# Patient Record
Sex: FEMALE | Race: WHITE | Employment: UNEMPLOYED | URBAN - METROPOLITAN AREA
[De-identification: names, ages, dates, MRNs, and addresses within clinical notes are randomized per-mention and may not be internally consistent; named-entity substitution may affect disease eponyms.]

---

## 2017-02-13 ENCOUNTER — OFFICE VISIT (OUTPATIENT)
Dept: FAMILY MEDICINE CLINIC | Age: 77
End: 2017-02-13

## 2017-02-13 VITALS
SYSTOLIC BLOOD PRESSURE: 124 MMHG | HEART RATE: 82 BPM | BODY MASS INDEX: 27.74 KG/M2 | HEIGHT: 68 IN | WEIGHT: 183 LBS | OXYGEN SATURATION: 98 % | TEMPERATURE: 97.5 F | RESPIRATION RATE: 18 BRPM | DIASTOLIC BLOOD PRESSURE: 82 MMHG

## 2017-02-13 DIAGNOSIS — H35.30 MACULAR DEGENERATION: ICD-10-CM

## 2017-02-13 DIAGNOSIS — E11.39 TYPE 2 DIABETES MELLITUS WITH OTHER OPHTHALMIC COMPLICATION: ICD-10-CM

## 2017-02-13 DIAGNOSIS — I10 ESSENTIAL HYPERTENSION: Primary | ICD-10-CM

## 2017-02-13 DIAGNOSIS — I25.10 CORONARY ARTERY DISEASE INVOLVING NATIVE HEART WITHOUT ANGINA PECTORIS, UNSPECIFIED VESSEL OR LESION TYPE: ICD-10-CM

## 2017-02-13 DIAGNOSIS — E03.9 ACQUIRED HYPOTHYROIDISM: ICD-10-CM

## 2017-02-13 LAB — HBA1C MFR BLD: 6.3 % (ref 4.8–5.9)

## 2017-02-13 PROCEDURE — G8427 DOCREV CUR MEDS BY ELIG CLIN: HCPCS | Performed by: FAMILY MEDICINE

## 2017-02-13 PROCEDURE — 1036F TOBACCO NON-USER: CPT | Performed by: FAMILY MEDICINE

## 2017-02-13 PROCEDURE — G8484 FLU IMMUNIZE NO ADMIN: HCPCS | Performed by: FAMILY MEDICINE

## 2017-02-13 PROCEDURE — 1123F ACP DISCUSS/DSCN MKR DOCD: CPT | Performed by: FAMILY MEDICINE

## 2017-02-13 PROCEDURE — G8598 ASA/ANTIPLAT THER USED: HCPCS | Performed by: FAMILY MEDICINE

## 2017-02-13 PROCEDURE — G8400 PT W/DXA NO RESULTS DOC: HCPCS | Performed by: FAMILY MEDICINE

## 2017-02-13 PROCEDURE — G8420 CALC BMI NORM PARAMETERS: HCPCS | Performed by: FAMILY MEDICINE

## 2017-02-13 PROCEDURE — 99214 OFFICE O/P EST MOD 30 MIN: CPT | Performed by: FAMILY MEDICINE

## 2017-02-13 PROCEDURE — 1090F PRES/ABSN URINE INCON ASSESS: CPT | Performed by: FAMILY MEDICINE

## 2017-02-13 PROCEDURE — 4040F PNEUMOC VAC/ADMIN/RCVD: CPT | Performed by: FAMILY MEDICINE

## 2017-02-13 RX ORDER — ATORVASTATIN CALCIUM 80 MG/1
TABLET, FILM COATED ORAL
Qty: 90 TABLET | Refills: 3 | Status: SHIPPED | OUTPATIENT
Start: 2017-02-13 | End: 2017-08-10 | Stop reason: SDUPTHER

## 2017-02-13 RX ORDER — METOPROLOL SUCCINATE 200 MG/1
TABLET, EXTENDED RELEASE ORAL
Qty: 90 TABLET | Refills: 3 | Status: SHIPPED | OUTPATIENT
Start: 2017-02-13 | End: 2017-08-22 | Stop reason: SDUPTHER

## 2017-02-13 RX ORDER — DIAZEPAM 5 MG/1
5 TABLET ORAL 2 TIMES DAILY PRN
Qty: 60 TABLET | Refills: 5 | Status: SHIPPED | OUTPATIENT
Start: 2017-02-13 | End: 2017-08-22 | Stop reason: SDUPTHER

## 2017-02-13 RX ORDER — LEVOTHYROXINE SODIUM 137 UG/1
TABLET ORAL
Qty: 90 TABLET | Refills: 3 | Status: SHIPPED | OUTPATIENT
Start: 2017-02-13 | End: 2017-08-22 | Stop reason: SDUPTHER

## 2017-02-13 ASSESSMENT — ENCOUNTER SYMPTOMS
RESPIRATORY NEGATIVE: 1
CHEST TIGHTNESS: 0
COUGH: 0
EYES NEGATIVE: 1
RHINORRHEA: 0
GASTROINTESTINAL NEGATIVE: 1

## 2017-03-29 RX ORDER — NITROGLYCERIN 0.4 MG/1
0.4 TABLET SUBLINGUAL EVERY 5 MIN PRN
Qty: 25 TABLET | Refills: 1 | Status: SHIPPED | OUTPATIENT
Start: 2017-03-29 | End: 2017-08-22 | Stop reason: SDUPTHER

## 2017-04-08 ENCOUNTER — HOSPITAL ENCOUNTER (EMERGENCY)
Age: 77
Discharge: HOME | End: 2017-04-08
Attending: EMERGENCY MEDICINE
Payer: MEDICARE

## 2017-04-08 ENCOUNTER — APPOINTMENT (OUTPATIENT)
Dept: GENERAL RADIOLOGY | Age: 77
End: 2017-04-08
Payer: MEDICARE

## 2017-04-08 VITALS
RESPIRATION RATE: 16 BRPM | BODY MASS INDEX: 27.32 KG/M2 | DIASTOLIC BLOOD PRESSURE: 63 MMHG | HEART RATE: 80 BPM | TEMPERATURE: 97.8 F | HEIGHT: 66 IN | SYSTOLIC BLOOD PRESSURE: 124 MMHG | WEIGHT: 170 LBS | OXYGEN SATURATION: 99 %

## 2017-04-08 DIAGNOSIS — R07.9 CHEST PAIN, UNSPECIFIED TYPE: Primary | ICD-10-CM

## 2017-04-08 LAB
ANION GAP SERPL CALCULATED.3IONS-SCNC: 15 MEQ/L (ref 7–13)
BASOPHILS ABSOLUTE: 0.1 K/UL (ref 0–0.2)
BASOPHILS RELATIVE PERCENT: 0.8 %
BUN BLDV-MCNC: 20 MG/DL (ref 8–23)
CALCIUM SERPL-MCNC: 9.5 MG/DL (ref 8.6–10.2)
CHLORIDE BLD-SCNC: 102 MEQ/L (ref 98–107)
CO2: 23 MEQ/L (ref 22–29)
CREAT SERPL-MCNC: 1.02 MG/DL (ref 0.5–0.9)
EOSINOPHILS ABSOLUTE: 0.3 K/UL (ref 0–0.7)
EOSINOPHILS RELATIVE PERCENT: 3.5 %
GFR AFRICAN AMERICAN: >60
GFR NON-AFRICAN AMERICAN: 52.3
GLUCOSE BLD-MCNC: 122 MG/DL (ref 74–109)
HCT VFR BLD CALC: 35.1 % (ref 37–47)
HEMOGLOBIN: 11.6 G/DL (ref 12–16)
LYMPHOCYTES ABSOLUTE: 3.5 K/UL (ref 1–4.8)
LYMPHOCYTES RELATIVE PERCENT: 35.2 %
MCH RBC QN AUTO: 28.1 PG (ref 27–31.3)
MCHC RBC AUTO-ENTMCNC: 33 % (ref 33–37)
MCV RBC AUTO: 85.3 FL (ref 82–100)
MONOCYTES ABSOLUTE: 0.9 K/UL (ref 0.2–0.8)
MONOCYTES RELATIVE PERCENT: 9.3 %
NEUTROPHILS ABSOLUTE: 5.1 K/UL (ref 1.4–6.5)
NEUTROPHILS RELATIVE PERCENT: 51.2 %
PDW BLD-RTO: 17.2 % (ref 11.5–14.5)
PLATELET # BLD: 200 K/UL (ref 130–400)
POTASSIUM SERPL-SCNC: 3.9 MEQ/L (ref 3.5–5.1)
RBC # BLD: 4.11 M/UL (ref 4.2–5.4)
SODIUM BLD-SCNC: 140 MEQ/L (ref 132–144)
TROPONIN: <0.01 NG/ML (ref 0–0.01)
WBC # BLD: 9.9 K/UL (ref 4.8–10.8)

## 2017-04-08 PROCEDURE — 93005 ELECTROCARDIOGRAM TRACING: CPT

## 2017-04-08 PROCEDURE — 85025 COMPLETE CBC W/AUTO DIFF WBC: CPT

## 2017-04-08 PROCEDURE — 36415 COLL VENOUS BLD VENIPUNCTURE: CPT

## 2017-04-08 PROCEDURE — 71010 XR CHEST PORTABLE: CPT

## 2017-04-08 PROCEDURE — 80048 BASIC METABOLIC PNL TOTAL CA: CPT

## 2017-04-08 PROCEDURE — 6370000000 HC RX 637 (ALT 250 FOR IP): Performed by: EMERGENCY MEDICINE

## 2017-04-08 PROCEDURE — 84484 ASSAY OF TROPONIN QUANT: CPT

## 2017-04-08 PROCEDURE — 99285 EMERGENCY DEPT VISIT HI MDM: CPT

## 2017-04-08 RX ORDER — SODIUM CHLORIDE 0.9 % (FLUSH) 0.9 %
3 SYRINGE (ML) INJECTION EVERY 8 HOURS
Status: DISCONTINUED | OUTPATIENT
Start: 2017-04-08 | End: 2017-04-08 | Stop reason: HOSPADM

## 2017-04-08 RX ADMIN — NITROGLYCERIN 1 INCH: 20 OINTMENT TOPICAL at 01:52

## 2017-04-08 ASSESSMENT — PAIN DESCRIPTION - DESCRIPTORS: DESCRIPTORS: PRESSURE

## 2017-04-08 ASSESSMENT — PAIN SCALES - GENERAL
PAINLEVEL_OUTOF10: 0
PAINLEVEL_OUTOF10: 2

## 2017-04-08 ASSESSMENT — PAIN DESCRIPTION - LOCATION: LOCATION: CHEST;BACK

## 2017-04-08 ASSESSMENT — PAIN DESCRIPTION - FREQUENCY: FREQUENCY: INTERMITTENT

## 2017-04-08 ASSESSMENT — PAIN DESCRIPTION - ORIENTATION: ORIENTATION: RIGHT;MID

## 2017-04-08 ASSESSMENT — PAIN DESCRIPTION - PAIN TYPE: TYPE: ACUTE PAIN

## 2017-04-10 ENCOUNTER — CARE COORDINATION (OUTPATIENT)
Dept: CARE COORDINATION | Age: 77
End: 2017-04-10

## 2017-04-10 ENCOUNTER — OFFICE VISIT (OUTPATIENT)
Dept: FAMILY MEDICINE CLINIC | Age: 77
End: 2017-04-10

## 2017-04-10 VITALS
SYSTOLIC BLOOD PRESSURE: 122 MMHG | BODY MASS INDEX: 28.41 KG/M2 | OXYGEN SATURATION: 97 % | HEIGHT: 67 IN | TEMPERATURE: 97.8 F | HEART RATE: 72 BPM | RESPIRATION RATE: 18 BRPM | WEIGHT: 181 LBS | DIASTOLIC BLOOD PRESSURE: 80 MMHG

## 2017-04-10 DIAGNOSIS — I10 ESSENTIAL HYPERTENSION: ICD-10-CM

## 2017-04-10 DIAGNOSIS — R10.13 EPIGASTRIC PAIN: Primary | ICD-10-CM

## 2017-04-10 DIAGNOSIS — K25.7 CHRONIC GASTRIC ULCER: ICD-10-CM

## 2017-04-10 DIAGNOSIS — E03.9 ACQUIRED HYPOTHYROIDISM: ICD-10-CM

## 2017-04-10 PROCEDURE — G8427 DOCREV CUR MEDS BY ELIG CLIN: HCPCS | Performed by: FAMILY MEDICINE

## 2017-04-10 PROCEDURE — G8420 CALC BMI NORM PARAMETERS: HCPCS | Performed by: FAMILY MEDICINE

## 2017-04-10 PROCEDURE — 4040F PNEUMOC VAC/ADMIN/RCVD: CPT | Performed by: FAMILY MEDICINE

## 2017-04-10 PROCEDURE — 1036F TOBACCO NON-USER: CPT | Performed by: FAMILY MEDICINE

## 2017-04-10 PROCEDURE — 99213 OFFICE O/P EST LOW 20 MIN: CPT | Performed by: FAMILY MEDICINE

## 2017-04-10 PROCEDURE — 1123F ACP DISCUSS/DSCN MKR DOCD: CPT | Performed by: FAMILY MEDICINE

## 2017-04-10 PROCEDURE — G8598 ASA/ANTIPLAT THER USED: HCPCS | Performed by: FAMILY MEDICINE

## 2017-04-10 PROCEDURE — 1090F PRES/ABSN URINE INCON ASSESS: CPT | Performed by: FAMILY MEDICINE

## 2017-04-10 PROCEDURE — G8400 PT W/DXA NO RESULTS DOC: HCPCS | Performed by: FAMILY MEDICINE

## 2017-04-10 RX ORDER — PANTOPRAZOLE SODIUM 40 MG/1
40 TABLET, DELAYED RELEASE ORAL DAILY
Qty: 90 TABLET | Refills: 3 | Status: SHIPPED | OUTPATIENT
Start: 2017-04-10 | End: 2017-08-22

## 2017-04-12 LAB
EKG ATRIAL RATE: 80 BPM
EKG P AXIS: -17 DEGREES
EKG P-R INTERVAL: 160 MS
EKG Q-T INTERVAL: 418 MS
EKG QRS DURATION: 140 MS
EKG QTC CALCULATION (BAZETT): 482 MS
EKG R AXIS: -39 DEGREES
EKG T AXIS: 120 DEGREES
EKG VENTRICULAR RATE: 80 BPM

## 2017-08-10 RX ORDER — DIAZEPAM 5 MG/1
5 TABLET ORAL 2 TIMES DAILY PRN
Qty: 60 TABLET | Refills: 5 | Status: CANCELLED | OUTPATIENT
Start: 2017-08-10

## 2017-08-11 DIAGNOSIS — E78.5 HYPERLIPIDEMIA WITH TARGET LDL LESS THAN 70: ICD-10-CM

## 2017-08-11 DIAGNOSIS — E11.9 TYPE 2 DIABETES MELLITUS WITHOUT COMPLICATION, WITHOUT LONG-TERM CURRENT USE OF INSULIN (HCC): ICD-10-CM

## 2017-08-11 DIAGNOSIS — E03.9 ACQUIRED HYPOTHYROIDISM: ICD-10-CM

## 2017-08-11 DIAGNOSIS — I10 ESSENTIAL HYPERTENSION: Primary | ICD-10-CM

## 2017-08-14 DIAGNOSIS — E03.9 ACQUIRED HYPOTHYROIDISM: ICD-10-CM

## 2017-08-14 DIAGNOSIS — E11.9 TYPE 2 DIABETES MELLITUS WITHOUT COMPLICATION, WITHOUT LONG-TERM CURRENT USE OF INSULIN (HCC): ICD-10-CM

## 2017-08-14 DIAGNOSIS — E78.5 HYPERLIPIDEMIA WITH TARGET LDL LESS THAN 70: ICD-10-CM

## 2017-08-14 DIAGNOSIS — I10 ESSENTIAL HYPERTENSION: ICD-10-CM

## 2017-08-14 LAB
ALBUMIN SERPL-MCNC: 4.2 G/DL (ref 3.9–4.9)
ALP BLD-CCNC: 54 U/L (ref 40–130)
ALT SERPL-CCNC: 15 U/L (ref 0–33)
ANION GAP SERPL CALCULATED.3IONS-SCNC: 17 MEQ/L (ref 7–13)
AST SERPL-CCNC: 19 U/L (ref 0–35)
BILIRUB SERPL-MCNC: 0.6 MG/DL (ref 0–1.2)
BUN BLDV-MCNC: 17 MG/DL (ref 8–23)
CALCIUM SERPL-MCNC: 9.2 MG/DL (ref 8.6–10.2)
CHLORIDE BLD-SCNC: 103 MEQ/L (ref 98–107)
CHOLESTEROL, TOTAL: 114 MG/DL (ref 0–199)
CO2: 20 MEQ/L (ref 22–29)
CREAT SERPL-MCNC: 0.77 MG/DL (ref 0.5–0.9)
GFR AFRICAN AMERICAN: >60
GFR NON-AFRICAN AMERICAN: >60
GLOBULIN: 2.8 G/DL (ref 2.3–3.5)
GLUCOSE BLD-MCNC: 150 MG/DL (ref 74–109)
HBA1C MFR BLD: 6.1 % (ref 4.8–5.9)
HDLC SERPL-MCNC: 38 MG/DL (ref 40–59)
LDL CHOLESTEROL CALCULATED: 44 MG/DL (ref 0–129)
POTASSIUM SERPL-SCNC: 5 MEQ/L (ref 3.5–5.1)
SODIUM BLD-SCNC: 140 MEQ/L (ref 132–144)
TOTAL PROTEIN: 7 G/DL (ref 6.4–8.1)
TRIGL SERPL-MCNC: 158 MG/DL (ref 0–200)
TSH SERPL DL<=0.05 MIU/L-ACNC: 0.02 UIU/ML (ref 0.27–4.2)

## 2017-08-14 RX ORDER — ATORVASTATIN CALCIUM 80 MG/1
TABLET, FILM COATED ORAL
Qty: 90 TABLET | Refills: 3 | Status: SHIPPED | OUTPATIENT
Start: 2017-08-14 | End: 2017-08-22 | Stop reason: SDUPTHER

## 2017-08-14 RX ORDER — EMPAGLIFLOZIN 25 MG/1
TABLET, FILM COATED ORAL
Qty: 90 TABLET | Refills: 3 | Status: SHIPPED | OUTPATIENT
Start: 2017-08-14 | End: 2017-08-22

## 2017-08-22 ENCOUNTER — OFFICE VISIT (OUTPATIENT)
Dept: FAMILY MEDICINE CLINIC | Age: 77
End: 2017-08-22

## 2017-08-22 VITALS
DIASTOLIC BLOOD PRESSURE: 88 MMHG | WEIGHT: 170 LBS | OXYGEN SATURATION: 97 % | BODY MASS INDEX: 26.68 KG/M2 | HEART RATE: 74 BPM | RESPIRATION RATE: 16 BRPM | HEIGHT: 67 IN | TEMPERATURE: 97.6 F | SYSTOLIC BLOOD PRESSURE: 138 MMHG

## 2017-08-22 DIAGNOSIS — E03.9 ACQUIRED HYPOTHYROIDISM: ICD-10-CM

## 2017-08-22 DIAGNOSIS — I25.10 CORONARY ARTERY DISEASE INVOLVING NATIVE HEART WITHOUT ANGINA PECTORIS, UNSPECIFIED VESSEL OR LESION TYPE: ICD-10-CM

## 2017-08-22 DIAGNOSIS — Z95.1 S/P CABG X 5: ICD-10-CM

## 2017-08-22 DIAGNOSIS — I10 ESSENTIAL HYPERTENSION: Primary | ICD-10-CM

## 2017-08-22 DIAGNOSIS — Z23 NEED FOR PNEUMOCOCCAL VACCINATION: ICD-10-CM

## 2017-08-22 PROCEDURE — 90670 PCV13 VACCINE IM: CPT | Performed by: FAMILY MEDICINE

## 2017-08-22 PROCEDURE — G0009 ADMIN PNEUMOCOCCAL VACCINE: HCPCS | Performed by: FAMILY MEDICINE

## 2017-08-22 PROCEDURE — 99214 OFFICE O/P EST MOD 30 MIN: CPT | Performed by: FAMILY MEDICINE

## 2017-08-22 PROCEDURE — G8427 DOCREV CUR MEDS BY ELIG CLIN: HCPCS | Performed by: FAMILY MEDICINE

## 2017-08-22 PROCEDURE — G8419 CALC BMI OUT NRM PARAM NOF/U: HCPCS | Performed by: FAMILY MEDICINE

## 2017-08-22 PROCEDURE — 1036F TOBACCO NON-USER: CPT | Performed by: FAMILY MEDICINE

## 2017-08-22 PROCEDURE — G8400 PT W/DXA NO RESULTS DOC: HCPCS | Performed by: FAMILY MEDICINE

## 2017-08-22 PROCEDURE — G8598 ASA/ANTIPLAT THER USED: HCPCS | Performed by: FAMILY MEDICINE

## 2017-08-22 PROCEDURE — 1090F PRES/ABSN URINE INCON ASSESS: CPT | Performed by: FAMILY MEDICINE

## 2017-08-22 PROCEDURE — 4040F PNEUMOC VAC/ADMIN/RCVD: CPT | Performed by: FAMILY MEDICINE

## 2017-08-22 PROCEDURE — 1123F ACP DISCUSS/DSCN MKR DOCD: CPT | Performed by: FAMILY MEDICINE

## 2017-08-22 RX ORDER — LEVOTHYROXINE SODIUM 0.12 MG/1
TABLET ORAL
Qty: 90 TABLET | Refills: 3 | Status: SHIPPED | OUTPATIENT
Start: 2017-08-22 | End: 2018-01-01 | Stop reason: SDUPTHER

## 2017-08-22 RX ORDER — ATORVASTATIN CALCIUM 80 MG/1
TABLET, FILM COATED ORAL
Qty: 90 TABLET | Refills: 3 | Status: SHIPPED | OUTPATIENT
Start: 2017-08-22 | End: 2018-01-01 | Stop reason: SDUPTHER

## 2017-08-22 RX ORDER — METOPROLOL SUCCINATE 200 MG/1
TABLET, EXTENDED RELEASE ORAL
Qty: 90 TABLET | Refills: 3 | Status: SHIPPED | OUTPATIENT
Start: 2017-08-22 | End: 2018-01-01 | Stop reason: SDUPTHER

## 2017-08-22 RX ORDER — NITROGLYCERIN 0.4 MG/1
0.4 TABLET SUBLINGUAL EVERY 5 MIN PRN
Qty: 25 TABLET | Refills: 1 | Status: SHIPPED | OUTPATIENT
Start: 2017-08-22 | End: 2018-01-01 | Stop reason: SDUPTHER

## 2017-08-22 RX ORDER — DIAZEPAM 5 MG/1
5 TABLET ORAL 2 TIMES DAILY PRN
Qty: 60 TABLET | Refills: 5 | Status: SHIPPED | OUTPATIENT
Start: 2017-08-22 | End: 2018-01-01 | Stop reason: SDUPTHER

## 2017-08-22 ASSESSMENT — ENCOUNTER SYMPTOMS
GASTROINTESTINAL NEGATIVE: 1
CHEST TIGHTNESS: 0
EYES NEGATIVE: 1
COUGH: 0
RESPIRATORY NEGATIVE: 1
RHINORRHEA: 0

## 2017-08-22 ASSESSMENT — PATIENT HEALTH QUESTIONNAIRE - PHQ9
SUM OF ALL RESPONSES TO PHQ9 QUESTIONS 1 & 2: 0
1. LITTLE INTEREST OR PLEASURE IN DOING THINGS: 0
SUM OF ALL RESPONSES TO PHQ QUESTIONS 1-9: 0
2. FEELING DOWN, DEPRESSED OR HOPELESS: 0

## 2018-01-01 ENCOUNTER — CARE COORDINATION (OUTPATIENT)
Dept: CASE MANAGEMENT | Age: 78
End: 2018-01-01

## 2018-01-01 ENCOUNTER — TELEPHONE (OUTPATIENT)
Dept: OTHER | Facility: CLINIC | Age: 78
End: 2018-01-01

## 2018-01-01 ENCOUNTER — APPOINTMENT (OUTPATIENT)
Dept: GENERAL RADIOLOGY | Age: 78
DRG: 281 | End: 2018-01-01
Payer: MEDICARE

## 2018-01-01 ENCOUNTER — APPOINTMENT (OUTPATIENT)
Dept: GENERAL RADIOLOGY | Age: 78
End: 2018-01-01
Payer: MEDICARE

## 2018-01-01 ENCOUNTER — APPOINTMENT (OUTPATIENT)
Dept: CT IMAGING | Age: 78
DRG: 281 | End: 2018-01-01
Payer: MEDICARE

## 2018-01-01 ENCOUNTER — HOSPITAL ENCOUNTER (INPATIENT)
Age: 78
LOS: 2 days | Discharge: ANOTHER HOSPITAL | DRG: 281 | End: 2018-12-19
Attending: EMERGENCY MEDICINE | Admitting: INTERNAL MEDICINE
Payer: MEDICARE

## 2018-01-01 ENCOUNTER — TELEPHONE (OUTPATIENT)
Dept: PHARMACY | Facility: CLINIC | Age: 78
End: 2018-01-01

## 2018-01-01 ENCOUNTER — OFFICE VISIT (OUTPATIENT)
Dept: INTERNAL MEDICINE CLINIC | Age: 78
End: 2018-01-01
Payer: MEDICARE

## 2018-01-01 ENCOUNTER — HOSPITAL ENCOUNTER (INPATIENT)
Age: 78
LOS: 4 days | Discharge: HOME | DRG: 280 | End: 2018-12-01
Attending: STUDENT IN AN ORGANIZED HEALTH CARE EDUCATION/TRAINING PROGRAM | Admitting: INTERNAL MEDICINE
Payer: MEDICARE

## 2018-01-01 ENCOUNTER — APPOINTMENT (OUTPATIENT)
Dept: CARDIAC CATH/INVASIVE PROCEDURES | Age: 78
DRG: 280 | End: 2018-01-01
Payer: MEDICARE

## 2018-01-01 ENCOUNTER — OFFICE VISIT (OUTPATIENT)
Dept: FAMILY MEDICINE CLINIC | Age: 78
End: 2018-01-01
Payer: MEDICARE

## 2018-01-01 ENCOUNTER — NURSE ONLY (OUTPATIENT)
Dept: INTERNAL MEDICINE CLINIC | Age: 78
End: 2018-01-01
Payer: MEDICARE

## 2018-01-01 ENCOUNTER — APPOINTMENT (OUTPATIENT)
Dept: GENERAL RADIOLOGY | Age: 78
DRG: 280 | End: 2018-01-01
Payer: MEDICARE

## 2018-01-01 ENCOUNTER — HOSPITAL ENCOUNTER (EMERGENCY)
Age: 78
Discharge: HOME | End: 2018-12-06
Attending: EMERGENCY MEDICINE
Payer: MEDICARE

## 2018-01-01 VITALS
OXYGEN SATURATION: 97 % | HEART RATE: 74 BPM | HEIGHT: 67 IN | BODY MASS INDEX: 26.21 KG/M2 | WEIGHT: 167 LBS | DIASTOLIC BLOOD PRESSURE: 78 MMHG | RESPIRATION RATE: 18 BRPM | SYSTOLIC BLOOD PRESSURE: 132 MMHG | TEMPERATURE: 98.1 F

## 2018-01-01 VITALS
HEART RATE: 105 BPM | HEIGHT: 67 IN | TEMPERATURE: 98 F | SYSTOLIC BLOOD PRESSURE: 112 MMHG | DIASTOLIC BLOOD PRESSURE: 77 MMHG | WEIGHT: 150 LBS | RESPIRATION RATE: 99 BRPM | BODY MASS INDEX: 23.54 KG/M2 | OXYGEN SATURATION: 100 %

## 2018-01-01 VITALS
WEIGHT: 157.63 LBS | HEART RATE: 72 BPM | SYSTOLIC BLOOD PRESSURE: 125 MMHG | DIASTOLIC BLOOD PRESSURE: 63 MMHG | RESPIRATION RATE: 20 BRPM | HEIGHT: 67 IN | TEMPERATURE: 97.5 F | BODY MASS INDEX: 24.74 KG/M2 | OXYGEN SATURATION: 97 %

## 2018-01-01 VITALS
HEART RATE: 88 BPM | RESPIRATION RATE: 16 BRPM | DIASTOLIC BLOOD PRESSURE: 84 MMHG | TEMPERATURE: 98.2 F | HEIGHT: 67 IN | BODY MASS INDEX: 23.51 KG/M2 | WEIGHT: 149.8 LBS | SYSTOLIC BLOOD PRESSURE: 128 MMHG | OXYGEN SATURATION: 93 %

## 2018-01-01 VITALS
OXYGEN SATURATION: 98 % | WEIGHT: 161.4 LBS | TEMPERATURE: 97.7 F | HEART RATE: 65 BPM | DIASTOLIC BLOOD PRESSURE: 90 MMHG | RESPIRATION RATE: 17 BRPM | BODY MASS INDEX: 25.28 KG/M2 | SYSTOLIC BLOOD PRESSURE: 150 MMHG

## 2018-01-01 VITALS
TEMPERATURE: 98.8 F | DIASTOLIC BLOOD PRESSURE: 70 MMHG | SYSTOLIC BLOOD PRESSURE: 134 MMHG | BODY MASS INDEX: 23.34 KG/M2 | OXYGEN SATURATION: 95 % | RESPIRATION RATE: 18 BRPM | WEIGHT: 148.7 LBS | HEART RATE: 81 BPM | HEIGHT: 67 IN

## 2018-01-01 DIAGNOSIS — J18.9 PNEUMONIA OF BOTH LOWER LOBES DUE TO INFECTIOUS ORGANISM: ICD-10-CM

## 2018-01-01 DIAGNOSIS — E11.9 TYPE 2 DIABETES MELLITUS WITHOUT COMPLICATION, WITHOUT LONG-TERM CURRENT USE OF INSULIN (HCC): ICD-10-CM

## 2018-01-01 DIAGNOSIS — I10 ESSENTIAL HYPERTENSION: Primary | ICD-10-CM

## 2018-01-01 DIAGNOSIS — R06.00 DYSPNEA, UNSPECIFIED TYPE: Primary | ICD-10-CM

## 2018-01-01 DIAGNOSIS — E03.9 ACQUIRED HYPOTHYROIDISM: ICD-10-CM

## 2018-01-01 DIAGNOSIS — I25.10 CORONARY ARTERY DISEASE INVOLVING NATIVE HEART WITHOUT ANGINA PECTORIS, UNSPECIFIED VESSEL OR LESION TYPE: ICD-10-CM

## 2018-01-01 DIAGNOSIS — I25.9 CHRONIC MYOCARDIAL ISCHEMIA: ICD-10-CM

## 2018-01-01 DIAGNOSIS — L23.9 ALLERGIC CONTACT DERMATITIS, UNSPECIFIED TRIGGER: ICD-10-CM

## 2018-01-01 DIAGNOSIS — R19.5 HEME POSITIVE STOOL: ICD-10-CM

## 2018-01-01 DIAGNOSIS — Z95.1 S/P CABG X 5: ICD-10-CM

## 2018-01-01 DIAGNOSIS — E78.5 HYPERLIPIDEMIA WITH TARGET LDL LESS THAN 70: ICD-10-CM

## 2018-01-01 DIAGNOSIS — I50.23 ACUTE ON CHRONIC SYSTOLIC CONGESTIVE HEART FAILURE (HCC): ICD-10-CM

## 2018-01-01 DIAGNOSIS — I50.23 ACUTE ON CHRONIC SYSTOLIC CONGESTIVE HEART FAILURE (HCC): Primary | ICD-10-CM

## 2018-01-01 DIAGNOSIS — I21.4 NSTEMI (NON-ST ELEVATED MYOCARDIAL INFARCTION) (HCC): Primary | ICD-10-CM

## 2018-01-01 DIAGNOSIS — F41.9 ANXIETY: ICD-10-CM

## 2018-01-01 DIAGNOSIS — R07.9 CHEST PAIN, UNSPECIFIED TYPE: Primary | ICD-10-CM

## 2018-01-01 DIAGNOSIS — N17.9 AKI (ACUTE KIDNEY INJURY) (HCC): ICD-10-CM

## 2018-01-01 DIAGNOSIS — I34.0 MITRAL VALVE INSUFFICIENCY, UNSPECIFIED ETIOLOGY: ICD-10-CM

## 2018-01-01 DIAGNOSIS — I50.20 SYSTOLIC CONGESTIVE HEART FAILURE, UNSPECIFIED HF CHRONICITY (HCC): ICD-10-CM

## 2018-01-01 DIAGNOSIS — Z00.00 ROUTINE GENERAL MEDICAL EXAMINATION AT A HEALTH CARE FACILITY: ICD-10-CM

## 2018-01-01 DIAGNOSIS — R53.1 GENERAL WEAKNESS: ICD-10-CM

## 2018-01-01 DIAGNOSIS — Z23 NEED FOR INFLUENZA VACCINATION: Primary | ICD-10-CM

## 2018-01-01 DIAGNOSIS — E87.20 LACTIC ACIDOSIS: ICD-10-CM

## 2018-01-01 DIAGNOSIS — I10 ESSENTIAL HYPERTENSION: ICD-10-CM

## 2018-01-01 LAB
ABO/RH: NORMAL
ALBUMIN SERPL-MCNC: 3.1 G/DL (ref 3.9–4.9)
ALBUMIN SERPL-MCNC: 3.3 G/DL (ref 3.9–4.9)
ALBUMIN SERPL-MCNC: 3.4 G/DL (ref 3.9–4.9)
ALBUMIN SERPL-MCNC: 3.7 G/DL (ref 3.9–4.9)
ALBUMIN SERPL-MCNC: 3.7 G/DL (ref 3.9–4.9)
ALBUMIN SERPL-MCNC: 3.8 G/DL (ref 3.9–4.9)
ALBUMIN SERPL-MCNC: 3.8 G/DL (ref 3.9–4.9)
ALBUMIN SERPL-MCNC: 4.5 G/DL (ref 3.9–4.9)
ALBUMIN SERPL-MCNC: 4.7 G/DL (ref 3.9–4.9)
ALP BLD-CCNC: 120 U/L (ref 40–130)
ALP BLD-CCNC: 125 U/L (ref 40–130)
ALP BLD-CCNC: 60 U/L (ref 40–130)
ALP BLD-CCNC: 63 U/L (ref 40–130)
ALP BLD-CCNC: 68 U/L (ref 40–130)
ALT SERPL-CCNC: 11 U/L (ref 0–33)
ALT SERPL-CCNC: 156 U/L (ref 0–33)
ALT SERPL-CCNC: 159 U/L (ref 0–33)
ALT SERPL-CCNC: 23 U/L (ref 0–33)
ALT SERPL-CCNC: 82 U/L (ref 0–33)
ANION GAP SERPL CALCULATED.3IONS-SCNC: 15 MEQ/L (ref 7–13)
ANION GAP SERPL CALCULATED.3IONS-SCNC: 17 MEQ/L (ref 7–13)
ANION GAP SERPL CALCULATED.3IONS-SCNC: 19 MEQ/L (ref 7–13)
ANION GAP SERPL CALCULATED.3IONS-SCNC: 21 MEQ/L (ref 7–13)
ANION GAP SERPL CALCULATED.3IONS-SCNC: 22 MEQ/L (ref 7–13)
ANION GAP SERPL CALCULATED.3IONS-SCNC: 22 MEQ/L (ref 7–13)
ANISOCYTOSIS: ABNORMAL
ANTIBODY IDENTIFICATION: NORMAL
ANTIBODY SCREEN: NORMAL
APTT: 24.6 SEC (ref 21.6–35.4)
AST SERPL-CCNC: 13 U/L (ref 0–35)
AST SERPL-CCNC: 175 U/L (ref 0–35)
AST SERPL-CCNC: 198 U/L (ref 0–35)
AST SERPL-CCNC: 25 U/L (ref 0–35)
AST SERPL-CCNC: 29 U/L (ref 0–35)
ATYPICAL LYMPHOCYTE RELATIVE PERCENT: 2 %
BACTERIA: NEGATIVE /HPF
BASE EXCESS ARTERIAL: -10 (ref -3–3)
BASOPHILS ABSOLUTE: 0 K/UL (ref 0–0.2)
BASOPHILS ABSOLUTE: 0.1 K/UL (ref 0–0.2)
BASOPHILS ABSOLUTE: 0.1 K/UL (ref 0–0.2)
BASOPHILS RELATIVE PERCENT: 0.2 %
BASOPHILS RELATIVE PERCENT: 0.4 %
BASOPHILS RELATIVE PERCENT: 0.4 %
BASOPHILS RELATIVE PERCENT: 0.6 %
BASOPHILS RELATIVE PERCENT: 0.6 %
BASOPHILS RELATIVE PERCENT: 0.8 %
BASOPHILS RELATIVE PERCENT: 0.9 %
BETA-HYDROXYBUTYRATE: 29.8 MG/DL (ref 0.2–2.8)
BILIRUB SERPL-MCNC: 0.9 MG/DL (ref 0–1.2)
BILIRUB SERPL-MCNC: 0.9 MG/DL (ref 0–1.2)
BILIRUB SERPL-MCNC: 1.1 MG/DL (ref 0–1.2)
BILIRUB SERPL-MCNC: 1.3 MG/DL (ref 0–1.2)
BILIRUB SERPL-MCNC: 1.5 MG/DL (ref 0–1.2)
BILIRUBIN URINE: NEGATIVE
BILIRUBIN URINE: NEGATIVE
BLOOD BANK DISPENSE STATUS: NORMAL
BLOOD BANK DISPENSE STATUS: NORMAL
BLOOD BANK PRODUCT CODE: NORMAL
BLOOD BANK PRODUCT CODE: NORMAL
BLOOD CULTURE, ROUTINE: NORMAL
BLOOD, URINE: NEGATIVE
BLOOD, URINE: NEGATIVE
BPU ID: NORMAL
BPU ID: NORMAL
BUN BLDV-MCNC: 12 MG/DL (ref 8–23)
BUN BLDV-MCNC: 21 MG/DL (ref 8–23)
BUN BLDV-MCNC: 25 MG/DL (ref 8–23)
BUN BLDV-MCNC: 29 MG/DL (ref 8–23)
BUN BLDV-MCNC: 31 MG/DL (ref 8–23)
BUN BLDV-MCNC: 33 MG/DL (ref 8–23)
BUN BLDV-MCNC: 33 MG/DL (ref 8–23)
BUN BLDV-MCNC: 34 MG/DL (ref 8–23)
BUN BLDV-MCNC: 35 MG/DL (ref 8–23)
BUN BLDV-MCNC: 39 MG/DL (ref 8–23)
BUN BLDV-MCNC: 41 MG/DL (ref 8–23)
BURR CELLS: ABNORMAL
C-REACTIVE PROTEIN, HIGH SENSITIVITY: 0.4 MG/L (ref 0–5)
CALCIUM IONIZED: 1.17 MMOL/L (ref 1.12–1.32)
CALCIUM SERPL-MCNC: 10.6 MG/DL (ref 8.6–10.2)
CALCIUM SERPL-MCNC: 8.3 MG/DL (ref 8.6–10.2)
CALCIUM SERPL-MCNC: 8.5 MG/DL (ref 8.6–10.2)
CALCIUM SERPL-MCNC: 8.5 MG/DL (ref 8.6–10.2)
CALCIUM SERPL-MCNC: 8.7 MG/DL (ref 8.6–10.2)
CALCIUM SERPL-MCNC: 8.8 MG/DL (ref 8.6–10.2)
CALCIUM SERPL-MCNC: 8.9 MG/DL (ref 8.6–10.2)
CALCIUM SERPL-MCNC: 9 MG/DL (ref 8.6–10.2)
CALCIUM SERPL-MCNC: 9.5 MG/DL (ref 8.6–10.2)
CHLORIDE BLD-SCNC: 102 MEQ/L (ref 98–107)
CHLORIDE BLD-SCNC: 103 MEQ/L (ref 98–107)
CHLORIDE BLD-SCNC: 104 MEQ/L (ref 98–107)
CHLORIDE BLD-SCNC: 105 MEQ/L (ref 98–107)
CHLORIDE BLD-SCNC: 106 MEQ/L (ref 98–107)
CHLORIDE BLD-SCNC: 106 MEQ/L (ref 98–107)
CHLORIDE BLD-SCNC: 107 MEQ/L (ref 98–107)
CHLORIDE BLD-SCNC: 109 MEQ/L (ref 98–107)
CHLORIDE BLD-SCNC: 98 MEQ/L (ref 98–107)
CHOLESTEROL, TOTAL: 148 MG/DL (ref 0–199)
CLARITY: CLEAR
CLARITY: CLEAR
CO2: 12 MEQ/L (ref 22–29)
CO2: 14 MEQ/L (ref 22–29)
CO2: 15 MEQ/L (ref 22–29)
CO2: 15 MEQ/L (ref 22–29)
CO2: 16 MEQ/L (ref 22–29)
CO2: 18 MEQ/L (ref 22–29)
CO2: 19 MEQ/L (ref 22–29)
CO2: 20 MEQ/L (ref 22–29)
CO2: 21 MEQ/L (ref 22–29)
COLOR: YELLOW
COLOR: YELLOW
CREAT SERPL-MCNC: 0.84 MG/DL (ref 0.5–0.9)
CREAT SERPL-MCNC: 0.92 MG/DL (ref 0.5–0.9)
CREAT SERPL-MCNC: 1.02 MG/DL (ref 0.5–0.9)
CREAT SERPL-MCNC: 1.05 MG/DL (ref 0.5–0.9)
CREAT SERPL-MCNC: 1.12 MG/DL (ref 0.5–0.9)
CREAT SERPL-MCNC: 1.13 MG/DL (ref 0.5–0.9)
CREAT SERPL-MCNC: 1.3 MG/DL (ref 0.5–0.9)
CREAT SERPL-MCNC: 1.35 MG/DL (ref 0.5–0.9)
CREAT SERPL-MCNC: 1.5 MG/DL (ref 0.5–0.9)
CREAT SERPL-MCNC: 1.57 MG/DL (ref 0.5–0.9)
CREAT SERPL-MCNC: 1.6 MG/DL (ref 0.5–0.9)
CREATININE URINE: 86.3 MG/DL
CREATININE URINE: 93.7 MG/DL
CULTURE, BLOOD 2: NORMAL
DESCRIPTION BLOOD BANK: NORMAL
DESCRIPTION BLOOD BANK: NORMAL
EKG ATRIAL RATE: 102 BPM
EKG ATRIAL RATE: 48 BPM
EKG ATRIAL RATE: 75 BPM
EKG ATRIAL RATE: 79 BPM
EKG P AXIS: 44 DEGREES
EKG P AXIS: 62 DEGREES
EKG P AXIS: 74 DEGREES
EKG P-R INTERVAL: 126 MS
EKG P-R INTERVAL: 150 MS
EKG P-R INTERVAL: 154 MS
EKG P-R INTERVAL: 168 MS
EKG Q-T INTERVAL: 374 MS
EKG Q-T INTERVAL: 402 MS
EKG Q-T INTERVAL: 438 MS
EKG Q-T INTERVAL: 534 MS
EKG QRS DURATION: 130 MS
EKG QRS DURATION: 138 MS
EKG QTC CALCULATION (BAZETT): 460 MS
EKG QTC CALCULATION (BAZETT): 477 MS
EKG QTC CALCULATION (BAZETT): 487 MS
EKG QTC CALCULATION (BAZETT): 489 MS
EKG R AXIS: -24 DEGREES
EKG R AXIS: -32 DEGREES
EKG R AXIS: -32 DEGREES
EKG R AXIS: 37 DEGREES
EKG T AXIS: 125 DEGREES
EKG T AXIS: 126 DEGREES
EKG T AXIS: 134 DEGREES
EKG T AXIS: 157 DEGREES
EKG VENTRICULAR RATE: 102 BPM
EKG VENTRICULAR RATE: 48 BPM
EKG VENTRICULAR RATE: 75 BPM
EKG VENTRICULAR RATE: 79 BPM
EOSINOPHILS ABSOLUTE: 0 K/UL (ref 0–0.7)
EOSINOPHILS ABSOLUTE: 0.1 K/UL (ref 0–0.7)
EOSINOPHILS ABSOLUTE: 0.1 K/UL (ref 0–0.7)
EOSINOPHILS ABSOLUTE: 0.4 K/UL (ref 0–0.7)
EOSINOPHILS ABSOLUTE: 0.6 K/UL (ref 0–0.7)
EOSINOPHILS RELATIVE PERCENT: 0.1 %
EOSINOPHILS RELATIVE PERCENT: 0.3 %
EOSINOPHILS RELATIVE PERCENT: 0.4 %
EOSINOPHILS RELATIVE PERCENT: 1.1 %
EOSINOPHILS RELATIVE PERCENT: 1.2 %
EOSINOPHILS RELATIVE PERCENT: 6.7 %
EOSINOPHILS RELATIVE PERCENT: 8.3 %
EPITHELIAL CELLS, UA: ABNORMAL /HPF (ref 0–5)
FERRITIN: 9.4 NG/ML (ref 13–150)
GFR AFRICAN AMERICAN: 37.5
GFR AFRICAN AMERICAN: 38.3
GFR AFRICAN AMERICAN: 40.4
GFR AFRICAN AMERICAN: 45.6
GFR AFRICAN AMERICAN: 47.6
GFR AFRICAN AMERICAN: 52
GFR AFRICAN AMERICAN: 56
GFR AFRICAN AMERICAN: 56.6
GFR AFRICAN AMERICAN: >60
GFR NON-AFRICAN AMERICAN: 31
GFR NON-AFRICAN AMERICAN: 31.7
GFR NON-AFRICAN AMERICAN: 33.4
GFR NON-AFRICAN AMERICAN: 37.7
GFR NON-AFRICAN AMERICAN: 39.4
GFR NON-AFRICAN AMERICAN: 43
GFR NON-AFRICAN AMERICAN: 46.3
GFR NON-AFRICAN AMERICAN: 46.8
GFR NON-AFRICAN AMERICAN: 50.4
GFR NON-AFRICAN AMERICAN: 52.1
GFR NON-AFRICAN AMERICAN: 58.8
GFR NON-AFRICAN AMERICAN: >60
GLOBULIN: 2.5 G/DL (ref 2.3–3.5)
GLOBULIN: 2.6 G/DL (ref 2.3–3.5)
GLOBULIN: 2.6 G/DL (ref 2.3–3.5)
GLOBULIN: 2.8 G/DL (ref 2.3–3.5)
GLOBULIN: 3.4 G/DL (ref 2.3–3.5)
GLUCOSE BLD-MCNC: 105 MG/DL (ref 60–115)
GLUCOSE BLD-MCNC: 105 MG/DL (ref 60–115)
GLUCOSE BLD-MCNC: 105 MG/DL (ref 74–109)
GLUCOSE BLD-MCNC: 107 MG/DL (ref 60–115)
GLUCOSE BLD-MCNC: 107 MG/DL (ref 60–115)
GLUCOSE BLD-MCNC: 109 MG/DL (ref 60–115)
GLUCOSE BLD-MCNC: 110 MG/DL (ref 74–109)
GLUCOSE BLD-MCNC: 113 MG/DL (ref 60–115)
GLUCOSE BLD-MCNC: 113 MG/DL (ref 74–109)
GLUCOSE BLD-MCNC: 115 MG/DL (ref 60–115)
GLUCOSE BLD-MCNC: 116 MG/DL (ref 60–115)
GLUCOSE BLD-MCNC: 117 MG/DL (ref 60–115)
GLUCOSE BLD-MCNC: 118 MG/DL (ref 74–109)
GLUCOSE BLD-MCNC: 119 MG/DL (ref 60–115)
GLUCOSE BLD-MCNC: 119 MG/DL (ref 60–115)
GLUCOSE BLD-MCNC: 119 MG/DL (ref 74–109)
GLUCOSE BLD-MCNC: 119 MG/DL (ref 74–109)
GLUCOSE BLD-MCNC: 120 MG/DL (ref 74–109)
GLUCOSE BLD-MCNC: 123 MG/DL (ref 60–115)
GLUCOSE BLD-MCNC: 127 MG/DL (ref 60–115)
GLUCOSE BLD-MCNC: 128 MG/DL (ref 60–115)
GLUCOSE BLD-MCNC: 128 MG/DL (ref 60–115)
GLUCOSE BLD-MCNC: 130 MG/DL (ref 74–109)
GLUCOSE BLD-MCNC: 138 MG/DL (ref 60–115)
GLUCOSE BLD-MCNC: 141 MG/DL (ref 60–115)
GLUCOSE BLD-MCNC: 145 MG/DL (ref 60–115)
GLUCOSE BLD-MCNC: 145 MG/DL (ref 60–115)
GLUCOSE BLD-MCNC: 150 MG/DL (ref 60–115)
GLUCOSE BLD-MCNC: 156 MG/DL (ref 60–115)
GLUCOSE BLD-MCNC: 161 MG/DL (ref 74–109)
GLUCOSE BLD-MCNC: 178 MG/DL (ref 60–115)
GLUCOSE BLD-MCNC: 206 MG/DL (ref 60–115)
GLUCOSE BLD-MCNC: 81 MG/DL (ref 60–115)
GLUCOSE BLD-MCNC: 83 MG/DL (ref 60–115)
GLUCOSE BLD-MCNC: 85 MG/DL (ref 60–115)
GLUCOSE BLD-MCNC: 87 MG/DL (ref 60–115)
GLUCOSE BLD-MCNC: 97 MG/DL (ref 74–109)
GLUCOSE BLD-MCNC: 98 MG/DL (ref 74–109)
GLUCOSE URINE: >=1000 MG/DL
GLUCOSE URINE: >=1000 MG/DL
HBA1C MFR BLD: 6.2 % (ref 4.8–5.9)
HBA1C MFR BLD: 6.2 % (ref 4.8–5.9)
HCO3 ARTERIAL: 15 MMOL/L (ref 21–29)
HCT VFR BLD CALC: 30.2 % (ref 37–47)
HCT VFR BLD CALC: 30.7 % (ref 37–47)
HCT VFR BLD CALC: 31 % (ref 37–47)
HCT VFR BLD CALC: 31.6 % (ref 37–47)
HCT VFR BLD CALC: 31.8 % (ref 37–47)
HCT VFR BLD CALC: 34.6 % (ref 37–47)
HCT VFR BLD CALC: 35.8 % (ref 37–47)
HCT VFR BLD CALC: 36 % (ref 37–47)
HCT VFR BLD CALC: 36.5 % (ref 37–47)
HCT VFR BLD CALC: 36.7 % (ref 37–47)
HCT VFR BLD CALC: 37.8 % (ref 37–47)
HDLC SERPL-MCNC: 30 MG/DL (ref 40–59)
HEMATOLOGY PATH CONSULT: NORMAL
HEMATOLOGY PATH CONSULT: YES
HEMOGLOBIN: 10.1 G/DL (ref 12–16)
HEMOGLOBIN: 10.9 G/DL (ref 12–16)
HEMOGLOBIN: 11.1 G/DL (ref 12–16)
HEMOGLOBIN: 11.3 G/DL (ref 12–16)
HEMOGLOBIN: 11.5 G/DL (ref 12–16)
HEMOGLOBIN: 11.5 G/DL (ref 12–16)
HEMOGLOBIN: 11.6 GM/DL (ref 12–16)
HEMOGLOBIN: 11.8 G/DL (ref 12–16)
HEMOGLOBIN: 9.6 G/DL (ref 12–16)
HEMOGLOBIN: 9.7 G/DL (ref 12–16)
HEMOGLOBIN: 9.7 G/DL (ref 12–16)
HEMOGLOBIN: 9.9 G/DL (ref 12–16)
HYALINE CASTS: ABNORMAL /HPF (ref 0–5)
HYPOCHROMIA: ABNORMAL
INR BLD: 1.1
INR BLD: 1.6
IRON SATURATION: 4 % (ref 11–46)
IRON: 13 UG/DL (ref 37–145)
KETONES, URINE: NEGATIVE MG/DL
KETONES, URINE: NEGATIVE MG/DL
LACTATE: 6.46 MMOL/L (ref 0.4–2)
LACTIC ACID: 1.4 MMOL/L (ref 0.5–2.2)
LACTIC ACID: 2.1 MMOL/L (ref 0.5–2.2)
LACTIC ACID: 3.2 MMOL/L (ref 0.5–2.2)
LACTIC ACID: 4.3 MMOL/L (ref 0.5–2.2)
LACTIC ACID: 4.7 MMOL/L (ref 0.5–2.2)
LACTIC ACID: 6.3 MMOL/L (ref 0.5–2.2)
LACTIC ACID: 7.4 MMOL/L (ref 0.5–2.2)
LDL CHOLESTEROL CALCULATED: 97 MG/DL (ref 0–129)
LEUKOCYTE ESTERASE, URINE: NEGATIVE
LEUKOCYTE ESTERASE, URINE: NEGATIVE
LV EF: 45 %
LVEF MODALITY: NORMAL
LYMPHOCYTES ABSOLUTE: 1.1 K/UL (ref 1–4.8)
LYMPHOCYTES ABSOLUTE: 1.4 K/UL (ref 1–4.8)
LYMPHOCYTES ABSOLUTE: 1.8 K/UL (ref 1–4.8)
LYMPHOCYTES ABSOLUTE: 2.1 K/UL (ref 1–4.8)
LYMPHOCYTES ABSOLUTE: 2.3 K/UL (ref 1–4.8)
LYMPHOCYTES ABSOLUTE: 2.4 K/UL (ref 1–4.8)
LYMPHOCYTES ABSOLUTE: 2.8 K/UL (ref 1–4.8)
LYMPHOCYTES RELATIVE PERCENT: 13 %
LYMPHOCYTES RELATIVE PERCENT: 18.8 %
LYMPHOCYTES RELATIVE PERCENT: 21.9 %
LYMPHOCYTES RELATIVE PERCENT: 23.5 %
LYMPHOCYTES RELATIVE PERCENT: 24.1 %
LYMPHOCYTES RELATIVE PERCENT: 27.4 %
LYMPHOCYTES RELATIVE PERCENT: 38.8 %
MACROCYTES: ABNORMAL
MACROCYTES: ABNORMAL
MAGNESIUM: 1.5 MG/DL (ref 1.7–2.3)
MAGNESIUM: 1.6 MG/DL (ref 1.7–2.3)
MAGNESIUM: 1.7 MG/DL (ref 1.7–2.3)
MAGNESIUM: 1.8 MG/DL (ref 1.7–2.3)
MAGNESIUM: 1.9 MG/DL (ref 1.7–2.3)
MAGNESIUM: 2 MG/DL (ref 1.7–2.3)
MAGNESIUM: 2.1 MG/DL (ref 1.7–2.3)
MAGNESIUM: 2.2 MG/DL (ref 1.7–2.3)
MAGNESIUM: 2.3 MG/DL (ref 1.7–2.3)
MCH RBC QN AUTO: 25.5 PG (ref 27–31.3)
MCH RBC QN AUTO: 25.7 PG (ref 27–31.3)
MCH RBC QN AUTO: 25.8 PG (ref 27–31.3)
MCH RBC QN AUTO: 26 PG (ref 27–31.3)
MCH RBC QN AUTO: 26 PG (ref 27–31.3)
MCH RBC QN AUTO: 26.2 PG (ref 27–31.3)
MCH RBC QN AUTO: 26.6 PG (ref 27–31.3)
MCH RBC QN AUTO: 27.2 PG (ref 27–31.3)
MCH RBC QN AUTO: 27.7 PG (ref 27–31.3)
MCH RBC QN AUTO: 27.8 PG (ref 27–31.3)
MCH RBC QN AUTO: 27.8 PG (ref 27–31.3)
MCHC RBC AUTO-ENTMCNC: 30.3 % (ref 33–37)
MCHC RBC AUTO-ENTMCNC: 31.2 % (ref 33–37)
MCHC RBC AUTO-ENTMCNC: 31.3 % (ref 33–37)
MCHC RBC AUTO-ENTMCNC: 31.4 % (ref 33–37)
MCHC RBC AUTO-ENTMCNC: 31.5 % (ref 33–37)
MCHC RBC AUTO-ENTMCNC: 31.6 % (ref 33–37)
MCHC RBC AUTO-ENTMCNC: 31.7 % (ref 33–37)
MCHC RBC AUTO-ENTMCNC: 31.8 % (ref 33–37)
MCHC RBC AUTO-ENTMCNC: 31.9 % (ref 33–37)
MCV RBC AUTO: 81 FL (ref 82–100)
MCV RBC AUTO: 81.6 FL (ref 82–100)
MCV RBC AUTO: 81.7 FL (ref 82–100)
MCV RBC AUTO: 81.9 FL (ref 82–100)
MCV RBC AUTO: 82.3 FL (ref 82–100)
MCV RBC AUTO: 84 FL (ref 82–100)
MCV RBC AUTO: 84.9 FL (ref 82–100)
MCV RBC AUTO: 87 FL (ref 82–100)
MCV RBC AUTO: 88 FL (ref 82–100)
MCV RBC AUTO: 88.2 FL (ref 82–100)
MCV RBC AUTO: 89.7 FL (ref 82–100)
MICROALBUMIN UR-MCNC: 3.7 MG/DL
MICROALBUMIN UR-MCNC: <1.2 MG/DL
MICROALBUMIN/CREAT UR-RTO: 42.9 MG/G (ref 0–30)
MICROALBUMIN/CREAT UR-RTO: NORMAL MG/G (ref 0–30)
MICROCYTES: ABNORMAL
MONOCYTES ABSOLUTE: 0.5 K/UL (ref 0.2–0.8)
MONOCYTES ABSOLUTE: 0.8 K/UL (ref 0.2–0.8)
MONOCYTES ABSOLUTE: 0.8 K/UL (ref 0.2–0.8)
MONOCYTES ABSOLUTE: 0.9 K/UL (ref 0.2–0.8)
MONOCYTES ABSOLUTE: 1.1 K/UL (ref 0.2–0.8)
MONOCYTES ABSOLUTE: 1.1 K/UL (ref 0.2–0.8)
MONOCYTES ABSOLUTE: 1.2 K/UL (ref 0.2–0.8)
MONOCYTES RELATIVE PERCENT: 10.8 %
MONOCYTES RELATIVE PERCENT: 10.9 %
MONOCYTES RELATIVE PERCENT: 12 %
MONOCYTES RELATIVE PERCENT: 15 %
MONOCYTES RELATIVE PERCENT: 8.2 %
MONOCYTES RELATIVE PERCENT: 9.6 %
MONOCYTES RELATIVE PERCENT: 9.8 %
NEUTROPHILS ABSOLUTE: 3 K/UL (ref 1.4–6.5)
NEUTROPHILS ABSOLUTE: 4 K/UL (ref 1.4–6.5)
NEUTROPHILS ABSOLUTE: 4.5 K/UL (ref 1.4–6.5)
NEUTROPHILS ABSOLUTE: 5.2 K/UL (ref 1.4–6.5)
NEUTROPHILS ABSOLUTE: 5.7 K/UL (ref 1.4–6.5)
NEUTROPHILS ABSOLUTE: 7.1 K/UL (ref 1.4–6.5)
NEUTROPHILS ABSOLUTE: 7.2 K/UL (ref 1.4–6.5)
NEUTROPHILS RELATIVE PERCENT: 41.2 %
NEUTROPHILS RELATIVE PERCENT: 59.9 %
NEUTROPHILS RELATIVE PERCENT: 61 %
NEUTROPHILS RELATIVE PERCENT: 62.9 %
NEUTROPHILS RELATIVE PERCENT: 66.1 %
NEUTROPHILS RELATIVE PERCENT: 66.8 %
NEUTROPHILS RELATIVE PERCENT: 76 %
NITRITE, URINE: NEGATIVE
NITRITE, URINE: NEGATIVE
NUCLEATED RED BLOOD CELLS: 1 /100 WBC
O2 SAT, ARTERIAL: 92 % (ref 93–100)
OVALOCYTES: ABNORMAL
PCO2 ARTERIAL: 25 MM HG (ref 35–45)
PDW BLD-RTO: 16.8 % (ref 11.5–14.5)
PDW BLD-RTO: 16.9 % (ref 11.5–14.5)
PDW BLD-RTO: 17 % (ref 11.5–14.5)
PDW BLD-RTO: 17.1 % (ref 11.5–14.5)
PDW BLD-RTO: 17.2 % (ref 11.5–14.5)
PDW BLD-RTO: 17.4 % (ref 11.5–14.5)
PDW BLD-RTO: 18.1 % (ref 11.5–14.5)
PDW BLD-RTO: 26.7 % (ref 11.5–14.5)
PDW BLD-RTO: 26.8 % (ref 11.5–14.5)
PDW BLD-RTO: 27.2 % (ref 11.5–14.5)
PDW BLD-RTO: 27.5 % (ref 11.5–14.5)
PERFORMED ON: ABNORMAL
PERFORMED ON: NORMAL
PH ARTERIAL: 7.38 (ref 7.35–7.45)
PH UA: 5 (ref 5–9)
PH UA: 5 (ref 5–9)
PHOSPHORUS: 1.4 MG/DL (ref 2.5–4.5)
PHOSPHORUS: 1.6 MG/DL (ref 2.5–4.5)
PHOSPHORUS: 2.8 MG/DL (ref 2.5–4.5)
PHOSPHORUS: 4 MG/DL (ref 2.5–4.5)
PLATELET # BLD: 163 K/UL (ref 130–400)
PLATELET # BLD: 209 K/UL (ref 130–400)
PLATELET # BLD: 210 K/UL (ref 130–400)
PLATELET # BLD: 213 K/UL (ref 130–400)
PLATELET # BLD: 216 K/UL (ref 130–400)
PLATELET # BLD: 217 K/UL (ref 130–400)
PLATELET # BLD: 219 K/UL (ref 130–400)
PLATELET # BLD: 239 K/UL (ref 130–400)
PLATELET # BLD: 242 K/UL (ref 130–400)
PLATELET # BLD: 251 K/UL (ref 130–400)
PLATELET # BLD: 259 K/UL (ref 130–400)
PLATELET SLIDE REVIEW: ADEQUATE
PLATELET SLIDE REVIEW: ADEQUATE
PLATELET SLIDE REVIEW: NORMAL
PO2 ARTERIAL: 62 MM HG (ref 75–108)
POC CHLORIDE: 110 MEQ/L (ref 99–110)
POC CREATININE: 1.2 MG/DL (ref 0.6–1.2)
POC HEMATOCRIT: 34 % (ref 36–48)
POC POTASSIUM: 4.4 MEQ/L (ref 3.5–5.1)
POC SAMPLE TYPE: ABNORMAL
POC SODIUM: 137 MEQ/L (ref 136–145)
POIKILOCYTES: ABNORMAL
POLYCHROMASIA: ABNORMAL
POLYCHROMASIA: ABNORMAL
POTASSIUM SERPL-SCNC: 3.9 MEQ/L (ref 3.5–5.1)
POTASSIUM SERPL-SCNC: 4 MEQ/L (ref 3.5–5.1)
POTASSIUM SERPL-SCNC: 4.3 MEQ/L (ref 3.5–5.1)
POTASSIUM SERPL-SCNC: 4.6 MEQ/L (ref 3.5–5.1)
POTASSIUM SERPL-SCNC: 4.7 MEQ/L (ref 3.5–5.1)
POTASSIUM SERPL-SCNC: 4.7 MEQ/L (ref 3.5–5.1)
POTASSIUM SERPL-SCNC: 4.8 MEQ/L (ref 3.5–5.1)
POTASSIUM SERPL-SCNC: 4.9 MEQ/L (ref 3.5–5.1)
POTASSIUM SERPL-SCNC: 4.9 MEQ/L (ref 3.5–5.1)
POTASSIUM SERPL-SCNC: 5 MEQ/L (ref 3.5–5.1)
POTASSIUM SERPL-SCNC: 5.3 MEQ/L (ref 3.5–5.1)
PRO-BNP: 7943 PG/ML
PRO-BNP: NORMAL PG/ML
PROCALCITONIN: 0.04 NG/ML (ref 0–0.15)
PROTEIN UA: 100 MG/DL
PROTEIN UA: NEGATIVE MG/DL
PROTHROMBIN TIME: 11.6 SEC (ref 9–11.5)
PROTHROMBIN TIME: 15.9 SEC (ref 9–11.5)
RBC # BLD: 3.7 M/UL (ref 4.2–5.4)
RBC # BLD: 3.72 M/UL (ref 4.2–5.4)
RBC # BLD: 3.76 M/UL (ref 4.2–5.4)
RBC # BLD: 3.77 M/UL (ref 4.2–5.4)
RBC # BLD: 3.88 M/UL (ref 4.2–5.4)
RBC # BLD: 4.06 M/UL (ref 4.2–5.4)
RBC # BLD: 4.09 M/UL (ref 4.2–5.4)
RBC # BLD: 4.14 M/UL (ref 4.2–5.4)
RBC # BLD: 4.14 M/UL (ref 4.2–5.4)
RBC # BLD: 4.27 M/UL (ref 4.2–5.4)
RBC # BLD: 4.5 M/UL (ref 4.2–5.4)
REJECTED TEST: NORMAL
SCHISTOCYTES: ABNORMAL
SCHISTOCYTES: ABNORMAL
SLIDE REVIEW: ABNORMAL
SODIUM BLD-SCNC: 134 MEQ/L (ref 132–144)
SODIUM BLD-SCNC: 136 MEQ/L (ref 132–144)
SODIUM BLD-SCNC: 139 MEQ/L (ref 132–144)
SODIUM BLD-SCNC: 140 MEQ/L (ref 132–144)
SODIUM BLD-SCNC: 141 MEQ/L (ref 132–144)
SODIUM BLD-SCNC: 142 MEQ/L (ref 132–144)
SODIUM BLD-SCNC: 143 MEQ/L (ref 132–144)
SPECIFIC GRAVITY UA: 1.01 (ref 1–1.03)
SPECIFIC GRAVITY UA: 1.03 (ref 1–1.03)
TCO2 ARTERIAL: 16 (ref 22–29)
TOTAL CK: 49 U/L (ref 0–170)
TOTAL IRON BINDING CAPACITY: 360 UG/DL (ref 178–450)
TOTAL PROTEIN: 6 G/DL (ref 6.4–8.1)
TOTAL PROTEIN: 6.2 G/DL (ref 6.4–8.1)
TOTAL PROTEIN: 6.4 G/DL (ref 6.4–8.1)
TOTAL PROTEIN: 7.5 G/DL (ref 6.4–8.1)
TOTAL PROTEIN: 7.9 G/DL (ref 6.4–8.1)
TRIGL SERPL-MCNC: 103 MG/DL (ref 0–200)
TROPONIN: 0.12 NG/ML (ref 0–0.01)
TROPONIN: 0.14 NG/ML (ref 0–0.01)
TROPONIN: 0.16 NG/ML (ref 0–0.01)
TROPONIN: 0.2 NG/ML (ref 0–0.01)
TROPONIN: 0.28 NG/ML (ref 0–0.01)
TROPONIN: 0.29 NG/ML (ref 0–0.01)
TROPONIN: 0.36 NG/ML (ref 0–0.01)
TROPONIN: 0.4 NG/ML (ref 0–0.01)
TROPONIN: 0.5 NG/ML (ref 0–0.01)
TSH SERPL DL<=0.05 MIU/L-ACNC: 0.2 UIU/ML (ref 0.27–4.2)
TSH SERPL DL<=0.05 MIU/L-ACNC: <0.01 UIU/ML (ref 0.27–4.2)
URINE CULTURE, ROUTINE: NORMAL
URINE REFLEX TO CULTURE: ABNORMAL
URINE REFLEX TO CULTURE: YES
UROBILINOGEN, URINE: 0.2 E.U./DL
UROBILINOGEN, URINE: 1 E.U./DL
WBC # BLD: 10.3 K/UL (ref 4.8–10.8)
WBC # BLD: 10.8 K/UL (ref 4.8–10.8)
WBC # BLD: 13.7 K/UL (ref 4.8–10.8)
WBC # BLD: 5.3 K/UL (ref 4.8–10.8)
WBC # BLD: 6 K/UL (ref 4.8–10.8)
WBC # BLD: 7.2 K/UL (ref 4.8–10.8)
WBC # BLD: 7.5 K/UL (ref 4.8–10.8)
WBC # BLD: 8.5 K/UL (ref 4.8–10.8)
WBC # BLD: 9 K/UL (ref 4.8–10.8)
WBC # BLD: 9.2 K/UL (ref 4.8–10.8)
WBC # BLD: 9.3 K/UL (ref 4.8–10.8)
WBC UA: ABNORMAL /HPF (ref 0–5)

## 2018-01-01 PROCEDURE — 96366 THER/PROPH/DIAG IV INF ADDON: CPT

## 2018-01-01 PROCEDURE — 86901 BLOOD TYPING SEROLOGIC RH(D): CPT

## 2018-01-01 PROCEDURE — 93459 L HRT ART/GRFT ANGIO: CPT | Performed by: INTERNAL MEDICINE

## 2018-01-01 PROCEDURE — G8989 SELF CARE D/C STATUS: HCPCS

## 2018-01-01 PROCEDURE — 83735 ASSAY OF MAGNESIUM: CPT

## 2018-01-01 PROCEDURE — 84484 ASSAY OF TROPONIN QUANT: CPT

## 2018-01-01 PROCEDURE — 2500000003 HC RX 250 WO HCPCS: Performed by: INTERNAL MEDICINE

## 2018-01-01 PROCEDURE — 80069 RENAL FUNCTION PANEL: CPT

## 2018-01-01 PROCEDURE — 83880 ASSAY OF NATRIURETIC PEPTIDE: CPT

## 2018-01-01 PROCEDURE — 97165 OT EVAL LOW COMPLEX 30 MIN: CPT

## 2018-01-01 PROCEDURE — 85025 COMPLETE CBC W/AUTO DIFF WBC: CPT

## 2018-01-01 PROCEDURE — 6370000000 HC RX 637 (ALT 250 FOR IP): Performed by: INTERNAL MEDICINE

## 2018-01-01 PROCEDURE — 82010 KETONE BODYS QUAN: CPT

## 2018-01-01 PROCEDURE — 36415 COLL VENOUS BLD VENIPUNCTURE: CPT

## 2018-01-01 PROCEDURE — 94760 N-INVAS EAR/PLS OXIMETRY 1: CPT

## 2018-01-01 PROCEDURE — 82728 ASSAY OF FERRITIN: CPT

## 2018-01-01 PROCEDURE — 6360000002 HC RX W HCPCS: Performed by: EMERGENCY MEDICINE

## 2018-01-01 PROCEDURE — 85610 PROTHROMBIN TIME: CPT

## 2018-01-01 PROCEDURE — 86870 RBC ANTIBODY IDENTIFICATION: CPT

## 2018-01-01 PROCEDURE — 83540 ASSAY OF IRON: CPT

## 2018-01-01 PROCEDURE — 87040 BLOOD CULTURE FOR BACTERIA: CPT

## 2018-01-01 PROCEDURE — 99285 EMERGENCY DEPT VISIT HI MDM: CPT

## 2018-01-01 PROCEDURE — 85027 COMPLETE CBC AUTOMATED: CPT

## 2018-01-01 PROCEDURE — 81001 URINALYSIS AUTO W/SCOPE: CPT

## 2018-01-01 PROCEDURE — 2060000000 HC ICU INTERMEDIATE R&B

## 2018-01-01 PROCEDURE — 94640 AIRWAY INHALATION TREATMENT: CPT

## 2018-01-01 PROCEDURE — 2580000003 HC RX 258: Performed by: INTERNAL MEDICINE

## 2018-01-01 PROCEDURE — 6360000002 HC RX W HCPCS: Performed by: INTERNAL MEDICINE

## 2018-01-01 PROCEDURE — C9113 INJ PANTOPRAZOLE SODIUM, VIA: HCPCS | Performed by: EMERGENCY MEDICINE

## 2018-01-01 PROCEDURE — 1036F TOBACCO NON-USER: CPT | Performed by: FAMILY MEDICINE

## 2018-01-01 PROCEDURE — 83605 ASSAY OF LACTIC ACID: CPT

## 2018-01-01 PROCEDURE — 84132 ASSAY OF SERUM POTASSIUM: CPT

## 2018-01-01 PROCEDURE — C1769 GUIDE WIRE: HCPCS

## 2018-01-01 PROCEDURE — 82330 ASSAY OF CALCIUM: CPT

## 2018-01-01 PROCEDURE — 86850 RBC ANTIBODY SCREEN: CPT

## 2018-01-01 PROCEDURE — 99214 OFFICE O/P EST MOD 30 MIN: CPT | Performed by: FAMILY MEDICINE

## 2018-01-01 PROCEDURE — 85014 HEMATOCRIT: CPT

## 2018-01-01 PROCEDURE — B211YZZ FLUOROSCOPY OF MULTIPLE CORONARY ARTERIES USING OTHER CONTRAST: ICD-10-PCS | Performed by: INTERNAL MEDICINE

## 2018-01-01 PROCEDURE — 90662 IIV NO PRSV INCREASED AG IM: CPT | Performed by: FAMILY MEDICINE

## 2018-01-01 PROCEDURE — 80048 BASIC METABOLIC PNL TOTAL CA: CPT

## 2018-01-01 PROCEDURE — 6360000002 HC RX W HCPCS

## 2018-01-01 PROCEDURE — 2580000003 HC RX 258: Performed by: EMERGENCY MEDICINE

## 2018-01-01 PROCEDURE — 82043 UR ALBUMIN QUANTITATIVE: CPT

## 2018-01-01 PROCEDURE — G8979 MOBILITY GOAL STATUS: HCPCS

## 2018-01-01 PROCEDURE — 93010 ELECTROCARDIOGRAM REPORT: CPT | Performed by: INTERNAL MEDICINE

## 2018-01-01 PROCEDURE — 71045 X-RAY EXAM CHEST 1 VIEW: CPT

## 2018-01-01 PROCEDURE — 6370000000 HC RX 637 (ALT 250 FOR IP): Performed by: PHYSICIAN ASSISTANT

## 2018-01-01 PROCEDURE — G8598 ASA/ANTIPLAT THER USED: HCPCS | Performed by: FAMILY MEDICINE

## 2018-01-01 PROCEDURE — 1111F DSCHRG MED/CURRENT MED MERGE: CPT | Performed by: FAMILY MEDICINE

## 2018-01-01 PROCEDURE — G0008 ADMIN INFLUENZA VIRUS VAC: HCPCS | Performed by: FAMILY MEDICINE

## 2018-01-01 PROCEDURE — B215YZZ FLUOROSCOPY OF LEFT HEART USING OTHER CONTRAST: ICD-10-PCS | Performed by: INTERNAL MEDICINE

## 2018-01-01 PROCEDURE — 84443 ASSAY THYROID STIM HORMONE: CPT

## 2018-01-01 PROCEDURE — G8510 SCR DEP NEG, NO PLAN REQD: HCPCS | Performed by: FAMILY MEDICINE

## 2018-01-01 PROCEDURE — 2709999900 HC NON-CHARGEABLE SUPPLY

## 2018-01-01 PROCEDURE — 71250 CT THORAX DX C-: CPT

## 2018-01-01 PROCEDURE — 1111F DSCHRG MED/CURRENT MED MERGE: CPT | Performed by: PHARMACIST

## 2018-01-01 PROCEDURE — 97162 PT EVAL MOD COMPLEX 30 MIN: CPT

## 2018-01-01 PROCEDURE — 1090F PRES/ABSN URINE INCON ASSESS: CPT | Performed by: FAMILY MEDICINE

## 2018-01-01 PROCEDURE — 2580000003 HC RX 258: Performed by: HOSPITALIST

## 2018-01-01 PROCEDURE — 2580000003 HC RX 258: Performed by: PHYSICIAN ASSISTANT

## 2018-01-01 PROCEDURE — 80053 COMPREHEN METABOLIC PANEL: CPT

## 2018-01-01 PROCEDURE — 82570 ASSAY OF URINE CREATININE: CPT

## 2018-01-01 PROCEDURE — G8482 FLU IMMUNIZE ORDER/ADMIN: HCPCS | Performed by: FAMILY MEDICINE

## 2018-01-01 PROCEDURE — 4A023N7 MEASUREMENT OF CARDIAC SAMPLING AND PRESSURE, LEFT HEART, PERCUTANEOUS APPROACH: ICD-10-PCS | Performed by: INTERNAL MEDICINE

## 2018-01-01 PROCEDURE — 87086 URINE CULTURE/COLONY COUNT: CPT

## 2018-01-01 PROCEDURE — 80061 LIPID PANEL: CPT

## 2018-01-01 PROCEDURE — C1894 INTRO/SHEATH, NON-LASER: HCPCS

## 2018-01-01 PROCEDURE — G8978 MOBILITY CURRENT STATUS: HCPCS

## 2018-01-01 PROCEDURE — 1101F PT FALLS ASSESS-DOCD LE1/YR: CPT | Performed by: FAMILY MEDICINE

## 2018-01-01 PROCEDURE — 96374 THER/PROPH/DIAG INJ IV PUSH: CPT

## 2018-01-01 PROCEDURE — 86922 COMPATIBILITY TEST ANTIGLOB: CPT

## 2018-01-01 PROCEDURE — 82803 BLOOD GASES ANY COMBINATION: CPT

## 2018-01-01 PROCEDURE — 93005 ELECTROCARDIOGRAM TRACING: CPT

## 2018-01-01 PROCEDURE — G8427 DOCREV CUR MEDS BY ELIG CLIN: HCPCS | Performed by: FAMILY MEDICINE

## 2018-01-01 PROCEDURE — G0438 PPPS, INITIAL VISIT: HCPCS | Performed by: FAMILY MEDICINE

## 2018-01-01 PROCEDURE — G8988 SELF CARE GOAL STATUS: HCPCS

## 2018-01-01 PROCEDURE — G8400 PT W/DXA NO RESULTS DOC: HCPCS | Performed by: FAMILY MEDICINE

## 2018-01-01 PROCEDURE — G8987 SELF CARE CURRENT STATUS: HCPCS

## 2018-01-01 PROCEDURE — B213YZZ FLUOROSCOPY OF MULTIPLE CORONARY ARTERY BYPASS GRAFTS USING OTHER CONTRAST: ICD-10-PCS | Performed by: INTERNAL MEDICINE

## 2018-01-01 PROCEDURE — 93306 TTE W/DOPPLER COMPLETE: CPT

## 2018-01-01 PROCEDURE — 81003 URINALYSIS AUTO W/O SCOPE: CPT

## 2018-01-01 PROCEDURE — 3288F FALL RISK ASSESSMENT DOCD: CPT | Performed by: FAMILY MEDICINE

## 2018-01-01 PROCEDURE — 86900 BLOOD TYPING SEROLOGIC ABO: CPT

## 2018-01-01 PROCEDURE — G8420 CALC BMI NORM PARAMETERS: HCPCS | Performed by: FAMILY MEDICINE

## 2018-01-01 PROCEDURE — S0028 INJECTION, FAMOTIDINE, 20 MG: HCPCS | Performed by: INTERNAL MEDICINE

## 2018-01-01 PROCEDURE — 82948 REAGENT STRIP/BLOOD GLUCOSE: CPT

## 2018-01-01 PROCEDURE — 86141 C-REACTIVE PROTEIN HS: CPT

## 2018-01-01 PROCEDURE — 6360000002 HC RX W HCPCS: Performed by: PHYSICIAN ASSISTANT

## 2018-01-01 PROCEDURE — 82550 ASSAY OF CK (CPK): CPT

## 2018-01-01 PROCEDURE — 94664 DEMO&/EVAL PT USE INHALER: CPT

## 2018-01-01 PROCEDURE — B218YZZ FLUOROSCOPY OF LEFT INTERNAL MAMMARY BYPASS GRAFT USING OTHER CONTRAST: ICD-10-PCS | Performed by: INTERNAL MEDICINE

## 2018-01-01 PROCEDURE — 1123F ACP DISCUSS/DSCN MKR DOCD: CPT | Performed by: FAMILY MEDICINE

## 2018-01-01 PROCEDURE — 82565 ASSAY OF CREATININE: CPT

## 2018-01-01 PROCEDURE — 84145 PROCALCITONIN (PCT): CPT

## 2018-01-01 PROCEDURE — G8419 CALC BMI OUT NRM PARAM NOF/U: HCPCS | Performed by: FAMILY MEDICINE

## 2018-01-01 PROCEDURE — 2500000003 HC RX 250 WO HCPCS

## 2018-01-01 PROCEDURE — 2580000003 HC RX 258

## 2018-01-01 PROCEDURE — 96372 THER/PROPH/DIAG INJ SC/IM: CPT | Performed by: FAMILY MEDICINE

## 2018-01-01 PROCEDURE — 96365 THER/PROPH/DIAG IV INF INIT: CPT

## 2018-01-01 PROCEDURE — 2700000000 HC OXYGEN THERAPY PER DAY

## 2018-01-01 PROCEDURE — 83036 HEMOGLOBIN GLYCOSYLATED A1C: CPT

## 2018-01-01 PROCEDURE — 83550 IRON BINDING TEST: CPT

## 2018-01-01 PROCEDURE — 84295 ASSAY OF SERUM SODIUM: CPT

## 2018-01-01 PROCEDURE — 85730 THROMBOPLASTIN TIME PARTIAL: CPT

## 2018-01-01 PROCEDURE — 4040F PNEUMOC VAC/ADMIN/RCVD: CPT | Performed by: FAMILY MEDICINE

## 2018-01-01 PROCEDURE — 82435 ASSAY OF BLOOD CHLORIDE: CPT

## 2018-01-01 RX ORDER — METHYLPREDNISOLONE SODIUM SUCCINATE 125 MG/2ML
125 INJECTION, POWDER, LYOPHILIZED, FOR SOLUTION INTRAMUSCULAR; INTRAVENOUS ONCE
Status: COMPLETED | OUTPATIENT
Start: 2018-01-01 | End: 2018-01-01

## 2018-01-01 RX ORDER — DIAZEPAM 5 MG/1
5 TABLET ORAL 2 TIMES DAILY PRN
Qty: 60 TABLET | Refills: 5 | Status: SHIPPED | OUTPATIENT
Start: 2018-01-01 | End: 2018-01-01 | Stop reason: SDUPTHER

## 2018-01-01 RX ORDER — LEVOTHYROXINE SODIUM 0.12 MG/1
125 TABLET ORAL DAILY
Status: DISCONTINUED | OUTPATIENT
Start: 2018-01-01 | End: 2018-01-01 | Stop reason: HOSPADM

## 2018-01-01 RX ORDER — DEXTROSE MONOHYDRATE 50 MG/ML
100 INJECTION, SOLUTION INTRAVENOUS PRN
Status: DISCONTINUED | OUTPATIENT
Start: 2018-01-01 | End: 2018-01-01 | Stop reason: HOSPADM

## 2018-01-01 RX ORDER — ASPIRIN 81 MG/1
81 TABLET ORAL DAILY
Status: DISCONTINUED | OUTPATIENT
Start: 2018-01-01 | End: 2018-01-01 | Stop reason: HOSPADM

## 2018-01-01 RX ORDER — METOPROLOL SUCCINATE 100 MG/1
100 TABLET, EXTENDED RELEASE ORAL DAILY
Status: DISCONTINUED | OUTPATIENT
Start: 2018-01-01 | End: 2018-01-01

## 2018-01-01 RX ORDER — IPRATROPIUM BROMIDE AND ALBUTEROL SULFATE 2.5; .5 MG/3ML; MG/3ML
1 SOLUTION RESPIRATORY (INHALATION) EVERY 4 HOURS PRN
Status: DISCONTINUED | OUTPATIENT
Start: 2018-01-01 | End: 2018-01-01 | Stop reason: HOSPADM

## 2018-01-01 RX ORDER — IPRATROPIUM BROMIDE AND ALBUTEROL SULFATE 2.5; .5 MG/3ML; MG/3ML
1 SOLUTION RESPIRATORY (INHALATION) ONCE
Status: COMPLETED | OUTPATIENT
Start: 2018-01-01 | End: 2018-01-01

## 2018-01-01 RX ORDER — SODIUM CHLORIDE 0.9 % (FLUSH) 0.9 %
10 SYRINGE (ML) INJECTION PRN
Status: DISCONTINUED | OUTPATIENT
Start: 2018-01-01 | End: 2018-01-01 | Stop reason: HOSPADM

## 2018-01-01 RX ORDER — SODIUM CHLORIDE 9 MG/ML
INJECTION, SOLUTION INTRAVENOUS CONTINUOUS
Status: DISCONTINUED | OUTPATIENT
Start: 2018-01-01 | End: 2018-01-01

## 2018-01-01 RX ORDER — ATORVASTATIN CALCIUM 80 MG/1
TABLET, FILM COATED ORAL
Qty: 90 TABLET | Refills: 3 | Status: SHIPPED | OUTPATIENT
Start: 2018-01-01 | End: 2018-01-01 | Stop reason: SDUPTHER

## 2018-01-01 RX ORDER — METOPROLOL SUCCINATE 50 MG/1
50 TABLET, EXTENDED RELEASE ORAL DAILY
Status: DISCONTINUED | OUTPATIENT
Start: 2018-01-01 | End: 2018-01-01 | Stop reason: HOSPADM

## 2018-01-01 RX ORDER — DEXTROSE MONOHYDRATE 25 G/50ML
12.5 INJECTION, SOLUTION INTRAVENOUS PRN
Status: DISCONTINUED | OUTPATIENT
Start: 2018-01-01 | End: 2018-01-01 | Stop reason: HOSPADM

## 2018-01-01 RX ORDER — ATORVASTATIN CALCIUM 80 MG/1
80 TABLET, FILM COATED ORAL DAILY
Status: DISCONTINUED | OUTPATIENT
Start: 2018-01-01 | End: 2018-01-01 | Stop reason: HOSPADM

## 2018-01-01 RX ORDER — NITROGLYCERIN 0.4 MG/1
0.4 TABLET SUBLINGUAL EVERY 5 MIN PRN
Qty: 25 TABLET | Refills: 1 | Status: SHIPPED | OUTPATIENT
Start: 2018-01-01 | End: 2019-08-06

## 2018-01-01 RX ORDER — LEVOTHYROXINE SODIUM 0.12 MG/1
TABLET ORAL
Qty: 90 TABLET | Refills: 3 | Status: SHIPPED | OUTPATIENT
Start: 2018-01-01 | End: 2018-01-01 | Stop reason: SDUPTHER

## 2018-01-01 RX ORDER — METOPROLOL TARTRATE 5 MG/5ML
2.5 INJECTION INTRAVENOUS EVERY 6 HOURS
Status: DISCONTINUED | OUTPATIENT
Start: 2018-01-01 | End: 2018-01-01

## 2018-01-01 RX ORDER — ALPRAZOLAM 0.5 MG/1
0.5 TABLET ORAL
Status: ACTIVE | OUTPATIENT
Start: 2018-01-01 | End: 2018-01-01

## 2018-01-01 RX ORDER — ASPIRIN 325 MG
325 TABLET ORAL DAILY
Status: ON HOLD | COMMUNITY
End: 2018-01-01 | Stop reason: HOSPADM

## 2018-01-01 RX ORDER — DIAZEPAM 5 MG/1
5 TABLET ORAL NIGHTLY PRN
Status: DISCONTINUED | OUTPATIENT
Start: 2018-01-01 | End: 2018-01-01 | Stop reason: HOSPADM

## 2018-01-01 RX ORDER — NICOTINE POLACRILEX 4 MG
15 LOZENGE BUCCAL PRN
Status: DISCONTINUED | OUTPATIENT
Start: 2018-01-01 | End: 2018-01-01 | Stop reason: HOSPADM

## 2018-01-01 RX ORDER — MILRINONE LACTATE 0.2 MG/ML
0.38 INJECTION, SOLUTION INTRAVENOUS CONTINUOUS
Status: DISCONTINUED | OUTPATIENT
Start: 2018-01-01 | End: 2018-01-01 | Stop reason: HOSPADM

## 2018-01-01 RX ORDER — SODIUM BICARBONATE 650 MG/1
650 TABLET ORAL 3 TIMES DAILY
Status: DISCONTINUED | OUTPATIENT
Start: 2018-01-01 | End: 2018-01-01 | Stop reason: HOSPADM

## 2018-01-01 RX ORDER — EMPAGLIFLOZIN 25 MG/1
TABLET, FILM COATED ORAL
Qty: 90 TABLET | Refills: 3 | Status: SHIPPED | OUTPATIENT
Start: 2018-01-01 | End: 2018-01-01 | Stop reason: SDUPTHER

## 2018-01-01 RX ORDER — SODIUM CHLORIDE 9 MG/ML
INJECTION, SOLUTION INTRAVENOUS ONCE
Status: COMPLETED | OUTPATIENT
Start: 2018-01-01 | End: 2018-01-01

## 2018-01-01 RX ORDER — 0.9 % SODIUM CHLORIDE 0.9 %
1000 INTRAVENOUS SOLUTION INTRAVENOUS ONCE
Status: COMPLETED | OUTPATIENT
Start: 2018-01-01 | End: 2018-01-01

## 2018-01-01 RX ORDER — SODIUM CHLORIDE 0.9 % (FLUSH) 0.9 %
10 SYRINGE (ML) INJECTION EVERY 12 HOURS SCHEDULED
Status: DISCONTINUED | OUTPATIENT
Start: 2018-01-01 | End: 2018-01-01 | Stop reason: HOSPADM

## 2018-01-01 RX ORDER — METOPROLOL TARTRATE 5 MG/5ML
2.5 INJECTION INTRAVENOUS EVERY 6 HOURS PRN
Status: DISCONTINUED | OUTPATIENT
Start: 2018-01-01 | End: 2018-01-01

## 2018-01-01 RX ORDER — FAMOTIDINE 20 MG/1
20 TABLET, FILM COATED ORAL DAILY
Status: DISCONTINUED | OUTPATIENT
Start: 2018-01-01 | End: 2018-01-01 | Stop reason: HOSPADM

## 2018-01-01 RX ORDER — FENTANYL CITRATE 50 UG/ML
50 INJECTION, SOLUTION INTRAMUSCULAR; INTRAVENOUS ONCE
Status: COMPLETED | OUTPATIENT
Start: 2018-01-01 | End: 2018-01-01

## 2018-01-01 RX ORDER — ONDANSETRON 2 MG/ML
4 INJECTION INTRAMUSCULAR; INTRAVENOUS EVERY 6 HOURS PRN
Status: DISCONTINUED | OUTPATIENT
Start: 2018-01-01 | End: 2018-01-01 | Stop reason: HOSPADM

## 2018-01-01 RX ORDER — METOPROLOL SUCCINATE 200 MG/1
TABLET, EXTENDED RELEASE ORAL
Qty: 90 TABLET | Refills: 3 | Status: SHIPPED | OUTPATIENT
Start: 2018-01-01 | End: 2018-01-01 | Stop reason: SDUPTHER

## 2018-01-01 RX ORDER — METHYLPREDNISOLONE ACETATE 80 MG/ML
80 INJECTION, SUSPENSION INTRA-ARTICULAR; INTRALESIONAL; INTRAMUSCULAR; SOFT TISSUE ONCE
Status: COMPLETED | OUTPATIENT
Start: 2018-01-01 | End: 2018-01-01

## 2018-01-01 RX ORDER — ASPIRIN 325 MG
325 TABLET ORAL DAILY
Qty: 30 TABLET | Refills: 5 | Status: SHIPPED | OUTPATIENT
Start: 2018-01-01 | End: 2018-01-01

## 2018-01-01 RX ORDER — METOPROLOL SUCCINATE 50 MG/1
50 TABLET, EXTENDED RELEASE ORAL DAILY
Qty: 30 TABLET | Refills: 3 | Status: SHIPPED | OUTPATIENT
Start: 2018-01-01

## 2018-01-01 RX ORDER — LEVOTHYROXINE SODIUM 0.12 MG/1
TABLET ORAL
Qty: 90 TABLET | Refills: 3 | Status: SHIPPED | OUTPATIENT
Start: 2018-01-01

## 2018-01-01 RX ORDER — ACETAMINOPHEN 325 MG/1
650 TABLET ORAL EVERY 4 HOURS PRN
Status: DISCONTINUED | OUTPATIENT
Start: 2018-01-01 | End: 2018-01-01 | Stop reason: HOSPADM

## 2018-01-01 RX ORDER — METOPROLOL SUCCINATE 200 MG/1
TABLET, EXTENDED RELEASE ORAL
Qty: 90 TABLET | Refills: 3 | Status: ON HOLD | OUTPATIENT
Start: 2018-01-01 | End: 2018-01-01 | Stop reason: HOSPADM

## 2018-01-01 RX ORDER — NITROGLYCERIN 0.4 MG/1
0.4 TABLET SUBLINGUAL EVERY 5 MIN PRN
Status: DISCONTINUED | OUTPATIENT
Start: 2018-01-01 | End: 2018-01-01 | Stop reason: HOSPADM

## 2018-01-01 RX ORDER — METOPROLOL SUCCINATE 100 MG/1
200 TABLET, EXTENDED RELEASE ORAL DAILY
Status: DISCONTINUED | OUTPATIENT
Start: 2018-01-01 | End: 2018-01-01

## 2018-01-01 RX ORDER — DIAZEPAM 5 MG/1
TABLET ORAL
Status: CANCELLED | OUTPATIENT
Start: 2018-01-01

## 2018-01-01 RX ORDER — DIAPER,BRIEF,INFANT-TODD,DISP
EACH MISCELLANEOUS
Qty: 1 TUBE | Refills: 1 | Status: SHIPPED | OUTPATIENT
Start: 2018-01-01 | End: 2018-01-01

## 2018-01-01 RX ORDER — ATORVASTATIN CALCIUM 80 MG/1
TABLET, FILM COATED ORAL
Qty: 90 TABLET | Refills: 3 | Status: SHIPPED | OUTPATIENT
Start: 2018-01-01

## 2018-01-01 RX ORDER — LANOLIN ALCOHOL/MO/W.PET/CERES
325 CREAM (GRAM) TOPICAL
Qty: 90 TABLET | Refills: 1 | Status: SHIPPED | OUTPATIENT
Start: 2018-01-01

## 2018-01-01 RX ORDER — HYDROXYZINE HYDROCHLORIDE 25 MG/1
25 TABLET, FILM COATED ORAL 3 TIMES DAILY PRN
Status: DISCONTINUED | OUTPATIENT
Start: 2018-01-01 | End: 2018-01-01 | Stop reason: HOSPADM

## 2018-01-01 RX ORDER — MAGNESIUM SULFATE IN WATER 40 MG/ML
2 INJECTION, SOLUTION INTRAVENOUS ONCE
Status: COMPLETED | OUTPATIENT
Start: 2018-01-01 | End: 2018-01-01

## 2018-01-01 RX ORDER — PREDNISONE 20 MG/1
20 TABLET ORAL DAILY
Qty: 14 TABLET | Refills: 0 | Status: SHIPPED | OUTPATIENT
Start: 2018-01-01 | End: 2018-01-01

## 2018-01-01 RX ORDER — ASPIRIN 81 MG/1
324 TABLET, CHEWABLE ORAL ONCE
Status: COMPLETED | OUTPATIENT
Start: 2018-01-01 | End: 2018-01-01

## 2018-01-01 RX ORDER — DIAZEPAM 5 MG/1
5 TABLET ORAL NIGHTLY PRN
COMMUNITY
Start: 2018-01-01

## 2018-01-01 RX ORDER — LANOLIN ALCOHOL/MO/W.PET/CERES
400 CREAM (GRAM) TOPICAL DAILY
Qty: 30 TABLET | Refills: 11 | Status: ON HOLD | OUTPATIENT
Start: 2018-01-01 | End: 2018-01-01

## 2018-01-01 RX ORDER — LISINOPRIL 5 MG/1
5 TABLET ORAL DAILY
Status: DISCONTINUED | OUTPATIENT
Start: 2018-01-01 | End: 2018-01-01

## 2018-01-01 RX ORDER — FUROSEMIDE 10 MG/ML
40 INJECTION INTRAMUSCULAR; INTRAVENOUS ONCE
Status: COMPLETED | OUTPATIENT
Start: 2018-01-01 | End: 2018-01-01

## 2018-01-01 RX ORDER — ASPIRIN 81 MG/1
81 TABLET ORAL DAILY
Qty: 30 TABLET | Refills: 3 | Status: SHIPPED | OUTPATIENT
Start: 2018-01-01

## 2018-01-01 RX ORDER — DIPHENHYDRAMINE HYDROCHLORIDE 50 MG/ML
50 INJECTION INTRAMUSCULAR; INTRAVENOUS ONCE
Status: DISCONTINUED | OUTPATIENT
Start: 2018-01-01 | End: 2018-01-01 | Stop reason: ALTCHOICE

## 2018-01-01 RX ORDER — DIAZEPAM 5 MG/1
5 TABLET ORAL 2 TIMES DAILY PRN
Qty: 60 TABLET | Refills: 5 | Status: SHIPPED | OUTPATIENT
Start: 2018-01-01 | End: 2018-01-01

## 2018-01-01 RX ADMIN — MAGNESIUM OXIDE TAB 400 MG (241.3 MG ELEMENTAL MG) 400 MG: 400 (241.3 MG) TAB at 20:37

## 2018-01-01 RX ADMIN — INSULIN LISPRO 2 UNITS: 100 INJECTION, SOLUTION INTRAVENOUS; SUBCUTANEOUS at 11:48

## 2018-01-01 RX ADMIN — AZITHROMYCIN MONOHYDRATE 500 MG: 500 INJECTION, POWDER, LYOPHILIZED, FOR SOLUTION INTRAVENOUS at 19:41

## 2018-01-01 RX ADMIN — Medication 10 ML: at 22:34

## 2018-01-01 RX ADMIN — METOPROLOL SUCCINATE 100 MG: 100 TABLET, EXTENDED RELEASE ORAL at 08:59

## 2018-01-01 RX ADMIN — ASPIRIN 81 MG 324 MG: 81 TABLET ORAL at 18:16

## 2018-01-01 RX ADMIN — LISINOPRIL 5 MG: 5 TABLET ORAL at 08:22

## 2018-01-01 RX ADMIN — LEVOTHYROXINE SODIUM 125 MCG: 125 TABLET ORAL at 05:45

## 2018-01-01 RX ADMIN — MILRINONE LACTATE IN DEXTROSE 0.38 MCG/KG/MIN: 200 INJECTION, SOLUTION INTRAVENOUS at 08:06

## 2018-01-01 RX ADMIN — Medication 10 ML: at 09:31

## 2018-01-01 RX ADMIN — ATORVASTATIN CALCIUM 80 MG: 80 TABLET, FILM COATED ORAL at 08:59

## 2018-01-01 RX ADMIN — DIAZEPAM 5 MG: 5 TABLET ORAL at 22:10

## 2018-01-01 RX ADMIN — ASPIRIN 81 MG: 81 TABLET, COATED ORAL at 09:31

## 2018-01-01 RX ADMIN — IRON SUCROSE 200 MG: 20 INJECTION, SOLUTION INTRAVENOUS at 14:22

## 2018-01-01 RX ADMIN — LEVOTHYROXINE SODIUM 125 MCG: 125 TABLET ORAL at 09:48

## 2018-01-01 RX ADMIN — METHYLPREDNISOLONE SODIUM SUCCINATE 125 MG: 125 INJECTION, POWDER, FOR SOLUTION INTRAMUSCULAR; INTRAVENOUS at 18:20

## 2018-01-01 RX ADMIN — LEVOTHYROXINE SODIUM 125 MCG: 125 TABLET ORAL at 08:04

## 2018-01-01 RX ADMIN — FUROSEMIDE 40 MG: 10 INJECTION, SOLUTION INTRAMUSCULAR; INTRAVENOUS at 19:15

## 2018-01-01 RX ADMIN — IRON SUCROSE 200 MG: 20 INJECTION, SOLUTION INTRAVENOUS at 12:15

## 2018-01-01 RX ADMIN — Medication 10 ML: at 20:38

## 2018-01-01 RX ADMIN — SODIUM CHLORIDE 1000 ML: 9 INJECTION, SOLUTION INTRAVENOUS at 17:30

## 2018-01-01 RX ADMIN — DIAZEPAM 5 MG: 5 TABLET ORAL at 23:09

## 2018-01-01 RX ADMIN — ASPIRIN 81 MG: 81 TABLET, COATED ORAL at 08:59

## 2018-01-01 RX ADMIN — ATORVASTATIN CALCIUM 80 MG: 80 TABLET, FILM COATED ORAL at 07:40

## 2018-01-01 RX ADMIN — ATORVASTATIN CALCIUM 80 MG: 80 TABLET, FILM COATED ORAL at 08:04

## 2018-01-01 RX ADMIN — ASPIRIN 81 MG: 81 TABLET, COATED ORAL at 07:40

## 2018-01-01 RX ADMIN — LEVOTHYROXINE SODIUM 125 MCG: 125 TABLET ORAL at 08:01

## 2018-01-01 RX ADMIN — INSULIN LISPRO 2 UNITS: 100 INJECTION, SOLUTION INTRAVENOUS; SUBCUTANEOUS at 09:32

## 2018-01-01 RX ADMIN — METOPROLOL SUCCINATE 200 MG: 100 TABLET, EXTENDED RELEASE ORAL at 07:40

## 2018-01-01 RX ADMIN — Medication 10 ML: at 19:08

## 2018-01-01 RX ADMIN — FAMOTIDINE 20 MG: 10 INJECTION, SOLUTION INTRAVENOUS at 08:22

## 2018-01-01 RX ADMIN — HYDROXYZINE HYDROCHLORIDE 25 MG: 25 TABLET, FILM COATED ORAL at 08:45

## 2018-01-01 RX ADMIN — Medication 10 ML: at 21:48

## 2018-01-01 RX ADMIN — SACUBITRIL AND VALSARTAN 1 TABLET: 24; 26 TABLET, FILM COATED ORAL at 23:25

## 2018-01-01 RX ADMIN — ENOXAPARIN SODIUM 40 MG: 40 INJECTION SUBCUTANEOUS at 09:47

## 2018-01-01 RX ADMIN — LEVOTHYROXINE SODIUM 125 MCG: 125 TABLET ORAL at 07:40

## 2018-01-01 RX ADMIN — SODIUM CHLORIDE 80 MG: 9 INJECTION, SOLUTION INTRAVENOUS at 16:52

## 2018-01-01 RX ADMIN — Medication 10 ML: at 21:31

## 2018-01-01 RX ADMIN — METOPROLOL SUCCINATE 50 MG: 50 TABLET, EXTENDED RELEASE ORAL at 08:01

## 2018-01-01 RX ADMIN — Medication 10 ML: at 08:59

## 2018-01-01 RX ADMIN — ASPIRIN 81 MG: 81 TABLET, COATED ORAL at 08:04

## 2018-01-01 RX ADMIN — ASPIRIN 81 MG: 81 TABLET, COATED ORAL at 08:01

## 2018-01-01 RX ADMIN — IRON SUCROSE 200 MG: 20 INJECTION, SOLUTION INTRAVENOUS at 13:27

## 2018-01-01 RX ADMIN — SODIUM CHLORIDE: 9 INJECTION, SOLUTION INTRAVENOUS at 14:24

## 2018-01-01 RX ADMIN — SACUBITRIL AND VALSARTAN 1 TABLET: 24; 26 TABLET, FILM COATED ORAL at 08:04

## 2018-01-01 RX ADMIN — ASPIRIN 81 MG: 81 TABLET, COATED ORAL at 08:22

## 2018-01-01 RX ADMIN — SACUBITRIL AND VALSARTAN 1 TABLET: 24; 26 TABLET, FILM COATED ORAL at 21:48

## 2018-01-01 RX ADMIN — LEVOTHYROXINE SODIUM 125 MCG: 125 TABLET ORAL at 08:25

## 2018-01-01 RX ADMIN — MAGNESIUM OXIDE TAB 400 MG (241.3 MG ELEMENTAL MG) 400 MG: 400 (241.3 MG) TAB at 09:48

## 2018-01-01 RX ADMIN — METOPROLOL SUCCINATE 50 MG: 50 TABLET, EXTENDED RELEASE ORAL at 08:22

## 2018-01-01 RX ADMIN — IRON SUCROSE 200 MG: 20 INJECTION, SOLUTION INTRAVENOUS at 13:02

## 2018-01-01 RX ADMIN — FAMOTIDINE 20 MG: 20 TABLET ORAL at 08:01

## 2018-01-01 RX ADMIN — METOPROLOL SUCCINATE 50 MG: 50 TABLET, EXTENDED RELEASE ORAL at 08:04

## 2018-01-01 RX ADMIN — Medication 10 ML: at 10:01

## 2018-01-01 RX ADMIN — ENOXAPARIN SODIUM 40 MG: 40 INJECTION SUBCUTANEOUS at 09:31

## 2018-01-01 RX ADMIN — SODIUM CHLORIDE 1000 ML: 9 INJECTION, SOLUTION INTRAVENOUS at 17:28

## 2018-01-01 RX ADMIN — METHYLPREDNISOLONE ACETATE 80 MG: 80 INJECTION, SUSPENSION INTRA-ARTICULAR; INTRALESIONAL; INTRAMUSCULAR; SOFT TISSUE at 11:44

## 2018-01-01 RX ADMIN — Medication 10 ML: at 08:22

## 2018-01-01 RX ADMIN — SACUBITRIL AND VALSARTAN 1 TABLET: 24; 26 TABLET, FILM COATED ORAL at 08:59

## 2018-01-01 RX ADMIN — SODIUM CHLORIDE: 9 INJECTION, SOLUTION INTRAVENOUS at 07:40

## 2018-01-01 RX ADMIN — Medication 10 ML: at 09:00

## 2018-01-01 RX ADMIN — METOPROLOL SUCCINATE 200 MG: 100 TABLET, EXTENDED RELEASE ORAL at 09:48

## 2018-01-01 RX ADMIN — LISINOPRIL 5 MG: 5 TABLET ORAL at 09:31

## 2018-01-01 RX ADMIN — ENOXAPARIN SODIUM 40 MG: 40 INJECTION SUBCUTANEOUS at 08:22

## 2018-01-01 RX ADMIN — SODIUM CHLORIDE: 9 INJECTION, SOLUTION INTRAVENOUS at 23:11

## 2018-01-01 RX ADMIN — ATORVASTATIN CALCIUM 80 MG: 80 TABLET, FILM COATED ORAL at 09:48

## 2018-01-01 RX ADMIN — CEFTRIAXONE SODIUM 1 G: 1 INJECTION, POWDER, FOR SOLUTION INTRAMUSCULAR; INTRAVENOUS at 19:10

## 2018-01-01 RX ADMIN — MILRINONE LACTATE IN DEXTROSE 0.38 MCG/KG/MIN: 200 INJECTION, SOLUTION INTRAVENOUS at 22:10

## 2018-01-01 RX ADMIN — LEVOTHYROXINE SODIUM 125 MCG: 125 TABLET ORAL at 08:59

## 2018-01-01 RX ADMIN — ASPIRIN 81 MG: 81 TABLET, COATED ORAL at 09:48

## 2018-01-01 RX ADMIN — ENOXAPARIN SODIUM 30 MG: 30 INJECTION SUBCUTANEOUS at 08:01

## 2018-01-01 RX ADMIN — FENTANYL CITRATE 50 MCG: 50 INJECTION, SOLUTION INTRAMUSCULAR; INTRAVENOUS at 18:50

## 2018-01-01 RX ADMIN — MAGNESIUM SULFATE IN WATER 2 G: 40 INJECTION, SOLUTION INTRAVENOUS at 06:46

## 2018-01-01 RX ADMIN — FAMOTIDINE 20 MG: 10 INJECTION, SOLUTION INTRAVENOUS at 09:31

## 2018-01-01 RX ADMIN — IPRATROPIUM BROMIDE AND ALBUTEROL SULFATE 1 AMPULE: .5; 3 SOLUTION RESPIRATORY (INHALATION) at 18:38

## 2018-01-01 RX ADMIN — MILRINONE LACTATE IN DEXTROSE 0.38 MCG/KG/MIN: 200 INJECTION, SOLUTION INTRAVENOUS at 22:04

## 2018-01-01 RX ADMIN — METOPROLOL TARTRATE 2.5 MG: 5 INJECTION, SOLUTION INTRAVENOUS at 06:16

## 2018-01-01 ASSESSMENT — ENCOUNTER SYMPTOMS
CHEST TIGHTNESS: 0
SORE THROAT: 0
DIARRHEA: 0
RHINORRHEA: 0
NAUSEA: 0
BACK PAIN: 0
GASTROINTESTINAL NEGATIVE: 1
NAUSEA: 0
GASTROINTESTINAL NEGATIVE: 1
RHINORRHEA: 0
PHOTOPHOBIA: 0
VOMITING: 0
SHORTNESS OF BREATH: 0
VOMITING: 0
SHORTNESS OF BREATH: 0
NAUSEA: 0
SORE THROAT: 0
VOMITING: 0
SHORTNESS OF BREATH: 1
COUGH: 0
COUGH: 0
RHINORRHEA: 0
RESPIRATORY NEGATIVE: 1
EYES NEGATIVE: 1
CHEST TIGHTNESS: 0
ANAL BLEEDING: 0
EYES NEGATIVE: 1
RESPIRATORY NEGATIVE: 1
COUGH: 0
SHORTNESS OF BREATH: 1
DIARRHEA: 0
ABDOMINAL PAIN: 0
SORE THROAT: 0
COUGH: 0
BACK PAIN: 0
EYE PAIN: 0
ABDOMINAL PAIN: 0
BLOOD IN STOOL: 0
VOMITING: 0
EYE PAIN: 0
ABDOMINAL PAIN: 0
COUGH: 1
DIARRHEA: 0
NAUSEA: 0
CHEST TIGHTNESS: 1

## 2018-01-01 ASSESSMENT — PAIN DESCRIPTION - ONSET: ONSET: SUDDEN

## 2018-01-01 ASSESSMENT — PAIN SCALES - GENERAL
PAINLEVEL_OUTOF10: 0
PAINLEVEL_OUTOF10: 0
PAINLEVEL_OUTOF10: 3
PAINLEVEL_OUTOF10: 0
PAINLEVEL_OUTOF10: 2
PAINLEVEL_OUTOF10: 0
PAINLEVEL_OUTOF10: 2
PAINLEVEL_OUTOF10: 0

## 2018-01-01 ASSESSMENT — PATIENT HEALTH QUESTIONNAIRE - PHQ9
1. LITTLE INTEREST OR PLEASURE IN DOING THINGS: 0
SUM OF ALL RESPONSES TO PHQ9 QUESTIONS 1 & 2: 0
SUM OF ALL RESPONSES TO PHQ QUESTIONS 1-9: 0
2. FEELING DOWN, DEPRESSED OR HOPELESS: 0
SUM OF ALL RESPONSES TO PHQ QUESTIONS 1-9: 0

## 2018-01-01 ASSESSMENT — LIFESTYLE VARIABLES: HOW OFTEN DO YOU HAVE A DRINK CONTAINING ALCOHOL: 0

## 2018-01-01 ASSESSMENT — PAIN DESCRIPTION - PAIN TYPE
TYPE: ACUTE PAIN

## 2018-01-01 ASSESSMENT — ANXIETY QUESTIONNAIRES: GAD7 TOTAL SCORE: 6

## 2018-01-01 ASSESSMENT — PAIN DESCRIPTION - LOCATION
LOCATION: CHEST
LOCATION: CHEST

## 2018-01-01 ASSESSMENT — PAIN DESCRIPTION - DESCRIPTORS
DESCRIPTORS: RADIATING;PRESSURE
DESCRIPTORS: PRESSURE

## 2018-01-01 ASSESSMENT — PAIN DESCRIPTION - FREQUENCY: FREQUENCY: CONTINUOUS

## 2018-02-06 NOTE — PROGRESS NOTES
Medicare Annual Wellness Visit  Name: Tray Lopez Date: 2018   MRN: [id number] Sex: Female   Age: 76 y.o. Ethnicity: Non-/Non    : 1940 Race: Raymundo Cervantes is here for Medicare AWV    Screenings for behavioral, psychosocial and functional/safety risks, and cognitive dysfunction are all negative except as indicated below. These results, as well as other patient data from the [address] form, are documented in Flowsheets linked to this Encounter. Allergies   Allergen Reactions    Carlos-1  [Lidocaine] Other (See Comments)     Made Eyes burn Badly, and couldn't see.  Procaine Other (See Comments)     Pass out, Crash cart        Prior to Visit Medications    Medication Sig Taking? Authorizing Provider   betamethasone valerate (VALISONE) 0.1 % cream Apply topically 2 times daily Apply topically 2 times daily. Yes Franklin Canchola MD   metoprolol succinate (TOPROL XL) 200 MG extended release tablet TAKE ONE TABLET BY MOUTH ONCE DAILY Yes Franklin Canchola MD   metFORMIN (GLUCOPHAGE) 850 MG tablet TAKE ONE TABLET BY MOUTH TWICE DAILY WITH MEALS Yes Franklin Canchola MD   levothyroxine (SYNTHROID) 125 MCG tablet TAKE ONE TABLET BY MOUTH EVERY DAY Yes Franklin Canchola MD   atorvastatin (LIPITOR) 80 MG tablet TAKE ONE TABLET BY MOUTH EVERY DAY Yes Franklin Canchola MD   diazepam (VALIUM) 5 MG tablet Take 1 tablet by mouth 2 times daily as needed for Anxiety for up to 30 days. Yes Franklin Canchola MD   metoprolol succinate (TOPROL XL) 200 MG extended release tablet TAKE ONE TABLET BY MOUTH ONCE DAILY Yes Franklin Canchola MD   nitroGLYCERIN (NITROSTAT) 0.4 MG SL tablet Place 1 tablet under the tongue every 5 minutes as needed for Chest pain Yes Franklin Canchola MD   beta carotene 15 MG capsule Take 15 mg by mouth daily Yes Historical Provider, MD   Multiple Vitamins-Minerals (PRESERVISION AREDS PO) Take  by mouth daily.  Yes Historical Provider, MD   fish oil-omega-3 fatty acids 1000 MG capsule Take 2 g by mouth daily. Yes Historical Provider, MD   aspirin 325 MG tablet Take 325 mg by mouth daily. Yes Historical Provider, MD   folic acid (FOLVITE) 756 MCG tablet Take 400 mcg by mouth daily. Yes Historical Provider, MD   B Complex Vitamins (VITAMIN B COMPLEX PO) Take  by mouth daily.    Yes Historical Provider, MD       Past Medical History:   Diagnosis Date    Anxiety     CAD (coronary artery disease)     Diabetes mellitus ([address]     Hyperlipidemia     Hypertension     Hypothyroidism     Upper GI bleed      Past Surgical History:   Procedure Laterality Date    CHOLECYSTECTOMY  1993    CORONARY ARTERY BYPASS GRAFT      X2       Family History   Problem Relation Age of Onset    Heart Disease Father     Kidney Disease Mother     Mental Illness Sister     Heart Disease Brother        CareTeam (Including outside providers/suppliers regularly involved in providing care):   Patient Care Team:  Xavier Nissen, MD as PCP - General (Family Medicine)  Xavier Nissen, MD as PCP - S Attributed Provider    Wt Readings from Last 3 Encounters:   02/06/18 167 lb (75.8 kg)   08/22/17 170 lb (77.1 kg)   04/10/17 181 lb (82.1 kg)     Vitals:    02/06/18 0856   BP: 132/78   Pulse: 74   Resp: 18   Temp: 98.1 °F (36.7 °C)   TempSrc: Tympanic   SpO2: 97%   Weight: 167 lb (75.8 kg)   Height: 5' 7\" (1.702 m)       General Appearance: alert and oriented to person, place and time, well developed and well- nourished, in no acute distress  Skin: warm and dry, no rash or erythema  Head: normocephalic and atraumatic  Eyes: pupils equal, round, and reactive to light, extraocular eye movements intact, conjunctivae normal  ENT: tympanic membrane, external ear and ear canal normal bilaterally, nose without deformity, nasal mucosa and turbinates normal without polyps  Neck: supple and non-tender without mass, no thyromegaly or thyroid nodules, no cervical lymphadenopathy  Pulmonary/Chest: clear to auscultation

## 2018-02-06 NOTE — PATIENT INSTRUCTIONS
Personalized Preventive Plan for Anais Jiménez - 2/6/2018  Medicare offers a range of preventive health benefits. Some of the tests and screenings are paid in full while other may be subject to a deductible, co-insurance, and/or copay. Some of these benefits include a comprehensive review of your medical history including lifestyle, illnesses that may run in your family, and various assessments and screenings as appropriate. After reviewing your medical record and screening and assessments performed today your provider may have ordered immunizations, labs, imaging, and/or referrals for you. A list of these orders (if applicable) as well as your Preventive Care list are included within your After Visit Summary for your review. Other Preventive Recommendations:    · A preventive eye exam performed by an eye specialist is recommended every 1-2 years to screen for glaucoma; cataracts, macular degeneration, and other eye disorders. · A preventive dental visit is recommended every 6 months. · Try to get at least 150 minutes of exercise per week or 10,000 steps per day on a pedometer . · Order or download the FREE \"Exercise & Physical Activity: Your Everyday Guide\" from [employer]. Call 6-545.912.5637 or search [employer] online. · You need 0877-8526 mg of calcium and 7932-8042 IU of vitamin D per day. It is possible to meet your calcium requirement with diet alone, but a vitamin D supplement is usually necessary to meet this goal.  · When exposed to the sun, use a sunscreen that protects against both UVA and UVB radiation with an SPF of 30 or greater. Reapply every 2 to 3 hours or after sweating, drying off with a towel, or swimming. · Always wear a seat belt when traveling in a car. Always wear a helmet when riding a bicycle or motorcycle.

## 2018-08-06 NOTE — PROGRESS NOTES
Patient is seen in follow up for   Chief Complaint   Patient presents with    Hypertension     Patient presents here for Htn.  Medication Refill     pending    Health Maintenance     denies shingrix , tdap and dexa. Hypertension   This is a chronic problem. The current episode started more than 1 year ago. The problem is unchanged. The problem is controlled. There are no associated agents to hypertension. Risk factors for coronary artery disease include diabetes mellitus. Past treatments include beta blockers. The current treatment provides significant improvement. There are no compliance problems. Diabetes   She presents for her follow-up diabetic visit. She has type 2 diabetes mellitus. Her disease course has been stable. There are no hypoglycemic associated symptoms. Pertinent negatives for hypoglycemia include no dizziness. There are no diabetic associated symptoms. Pertinent negatives for diabetes include no fatigue. There are no hypoglycemic complications. Symptoms are stable. There are no diabetic complications. Risk factors for coronary artery disease include diabetes mellitus and hypertension. Current diabetic treatment includes oral agent (dual therapy).        Past Medical History:   Diagnosis Date    Anxiety     CAD (coronary artery disease)     Diabetes mellitus ([address]     Hyperlipidemia     Hypertension     Hypothyroidism     Upper GI bleed      Patient Active Problem List    Diagnosis Date Noted    DM (diabetes mellitus), type 2 ([address] 08/07/2014    History of GI bleed 02/17/2014    S/P CABG x 5 02/17/2014    Macular degeneration 08/12/2013    Hyperlipidemia with target LDL less than 70 02/23/2012    Hypertension     Hypothyroidism     CAD (coronary artery disease)     Anxiety      Past Surgical History:   Procedure Laterality Date    CHOLECYSTECTOMY  1993    CORONARY ARTERY BYPASS GRAFT      X2     Family History   Problem Relation Age of Onset    Heart Disease Father  Kidney Disease Mother     Mental Illness Sister     Heart Disease Brother      Social History     Social History    Marital status:      Spouse name: N/A    Number of children: N/A    Years of education: N/A     Social History Main Topics    Smoking status: Never Smoker    Smokeless tobacco: Never Used    Alcohol use Yes      Comment: occ.  Drug use: No    Sexual activity: Not Asked     Other Topics Concern    None     Social History Narrative    None     Current Outpatient Prescriptions   Medication Sig Dispense Refill    diazepam (VALIUM) 5 MG tablet       empagliflozin (JARDIANCE) 25 MG tablet TAKE ONE TABLET BY MOUTH ONCE DAILY 90 tablet 3    metFORMIN (GLUCOPHAGE) 850 MG tablet TAKE ONE TABLET BY MOUTH TWICE DAILY WITH MEALS 180 tablet 1    metoprolol succinate (TOPROL XL) 200 MG extended release tablet TAKE ONE TABLET BY MOUTH ONCE DAILY 90 tablet 3    levothyroxine (SYNTHROID) 125 MCG tablet TAKE ONE TABLET BY MOUTH EVERY DAY 90 tablet 3    atorvastatin (LIPITOR) 80 MG tablet TAKE ONE TABLET BY MOUTH EVERY DAY 90 tablet 3    nitroGLYCERIN (NITROSTAT) 0.4 MG SL tablet Place 1 tablet under the tongue every 5 minutes as needed for Chest pain 25 tablet 1    beta carotene 15 MG capsule Take 1 capsule by mouth daily 30 capsule 11    folic acid (FOLVITE) 662 MCG tablet Take 1 tablet by mouth daily 30 tablet 11    diazepam (VALIUM) 5 MG tablet Take 1 tablet by mouth 2 times daily as needed for Anxiety for up to 30 days. . 60 tablet 5    betamethasone valerate (VALISONE) 0.1 % cream Apply topically 2 times daily Apply topically 2 times daily. 45 g 3    Multiple Vitamins-Minerals (PRESERVISION AREDS PO) Take  by mouth daily.  fish oil-omega-3 fatty acids 1000 MG capsule Take 2 g by mouth daily.  B Complex Vitamins (VITAMIN B COMPLEX PO) Take  by mouth daily. No current facility-administered medications for this visit.       Current Outpatient Prescriptions on File Left Ear: External ear normal.   Eyes: Conjunctivae are normal. Pupils are equal, round, and reactive to light. Neck: Normal range of motion. Neck supple. No thyromegaly present. Cardiovascular: Normal rate, regular rhythm and normal heart sounds. No murmur heard. Pulmonary/Chest: Effort normal and breath sounds normal.   Abdominal: Soft. Bowel sounds are normal. She exhibits no distension. There is no tenderness. Musculoskeletal: Normal range of motion. She exhibits no edema or tenderness. Lymphadenopathy:     She has no cervical adenopathy. Neurological: She is alert. No cranial nerve deficit. Coordination normal.       Assessment   Diagnosis Orders   1. Essential hypertension     2. Type 2 diabetes mellitus without complication, without long-term current use of insulin ([facility])  Microalbumin / Creatinine Urine Ratio   3. Acquired hypothyroidism     4. Hyperlipidemia with target LDL less than 70     5. S/P CABG x 5     6.  Anxiety  diazepam (VALIUM) 5 MG tablet     Problem List     Hypertension - Primary    Relevant Medications    nitroglycerin (NITRO-BID) 2 % ointment 1 inch (Completed)    metoprolol succinate (TOPROL XL) 200 MG extended release tablet    atorvastatin (LIPITOR) 80 MG tablet    nitroGLYCERIN (NITROSTAT) 0.4 MG SL tablet    Hypothyroidism    Relevant Medications    levothyroxine (SYNTHROID) 125 MCG tablet    Anxiety    Relevant Medications    diazepam (VALIUM) 5 MG tablet    diazepam (VALIUM) 5 MG tablet    Hyperlipidemia with target LDL less than 70    Relevant Medications    nitroglycerin (NITRO-BID) 2 % ointment 1 inch (Completed)    metoprolol succinate (TOPROL XL) 200 MG extended release tablet    atorvastatin (LIPITOR) 80 MG tablet    nitroGLYCERIN (NITROSTAT) 0.4 MG SL tablet    S/P CABG x 5    DM (diabetes mellitus), type 2 (HCC)    Relevant Medications    empagliflozin (JARDIANCE) 25 MG tablet    metFORMIN (GLUCOPHAGE) 850 MG tablet    Other Relevant Orders    Microalbumin /

## 2018-11-27 PROBLEM — I21.4 NSTEMI (NON-ST ELEVATED MYOCARDIAL INFARCTION) (HCC): Status: ACTIVE | Noted: 2018-01-01

## 2018-11-27 NOTE — ED PROVIDER NOTES
dictation. EMERGENCY DEPARTMENT COURSE and DIFFERENTIALDIAGNOSIS/MDM:   Vitals:    Vitals:    11/27/18 1610 11/27/18 1820 11/27/18 1852   BP: 109/71 117/78    Pulse: 77 79    Resp: 18 20 22   Temp: 97.6 °F (36.4 °C)     TempSrc: Oral     SpO2: 98% 98% 96%   Weight: 155 lb (70.3 kg)     Height: 5' 7\" (1.702 m)            Pt seen by dr aburto    Pt is nontoxic and no acute distress. She reports she feels fine. Sent in by cardiology. Xray reveals bilateral lower pneumonia. Cough x1 week. Elevated bnp. Elevated troponin. Given asprin, breathing tx, lasix, rocephin, azithromycin. Spoke to cardio dr Cady Paredes. Admitted to medicine dr Lorraine Rai. She is resting comfortably. ekg reveals nsr 79bpm, left axis lbbb. Lbbb is present on previous ekg. No acute st changes no ectopy. PROCEDURES:  Unless otherwise noted below, none     Procedures      FINAL IMPRESSION      1. NSTEMI (non-ST elevated myocardial infarction) ([facility] [address]    2.  Pneumonia of both lower lobes due to infectious organism [facility])          DISPOSITION/PLAN   DISPOSITION Decision To Admit 11/27/2018 06:09:13 PM          Chang Sanchez PA-C (electronically signed)  Attending Emergency Physician       Chang Sanchez PA-C  11/27/18 [address] PA-C  11/27/18 1910

## 2018-11-27 NOTE — ED NOTES
EKG completed by this ED Tech. Patient tolerated well. EKG resulted and transmitted. EKG was given to Tran.       Olayinka Sheets  11/27/18 3510

## 2018-11-27 NOTE — ED NOTES
Pt didn't urinate while in bathroom. States she tried but couldn't go.       Sanchez Arredondo RN  11/27/18 8316

## 2018-11-28 NOTE — PROGRESS NOTES
[facility] OF [facility] Heart Progress Note      Patient: Dee Mckeon  Unit/Bed: W737/L802-66  YOB: 1940  MRN: [id number]  Admit Date:  11/27/2018  Patient was seen in office on 11/27/18, and advised emergency room evaluation and admission. We will use my office note of 11/27/18 as the initial evaluation note. Patient reports feeling improved. Her hemoglobin is actually much better than what I expected. Iron studies are consistent with iron deficiency. Patient is receiving intravenous iron. I have reviewed her echocardiogram, and the report is noted below. She is not complaining of any chest pressure tightness heaviness lightheadedness, but she has not ambulated much. Subjective Complaint:   See above. Physical Examination:     /64   Pulse 76   Temp 97.3 °F (36.3 °C) (Oral)   Resp 17   Ht 5' 7\" (1.702 m)   Wt 158 lb 15.2 oz (72.1 kg)   SpO2 94%   BMI 24.90 kg/m²       Intake/Output Summary (Last 24 hours) at 11/28/18 1809  Last data filed at 11/28/18 1520   Gross per 24 hour   Intake             2540 ml   Output             1100 ml   Net             1440 ml                  Dee Mckeon examined at bedside . in no apparent distress. Focused exam reveals:     Cardiac: Heart regular rate and rhythm     Lungs:  clear to auscultation bilaterally- no wheezes, rales or rhonchi, normal air movement, no respiratory distress    Extremities:   No oedema. Telemetry:      normal sinus          LABS:   CBC:   Recent Labs      11/27/18   1630  11/28/18   0341   WBC  8.5  5.3   HGB  10.9*  9.7*   PLT  251  216      BMP:  Recent Labs      11/27/18   1630  11/28/18   0341   NA  142  139   K  5.0  4.9   CL  103  103   CO2  20*  14*   BUN  35*  33*   CREATININE  1.30*  1.12*   GLUCOSE  119*  130*              Troponin: No results for input(s): TROPONINT in the last 72 hours.      BNP:   Recent Labs      11/27/18   1630   PROBNP  14,499      INR: No results for input(s): INR in the last 72 cm   Tricuspid Valve   Estimated RVSP: 71.73 mmHg              Estimated RAP: 10 mmHg   TR Velocity: 3.93 m/s                   TR Gradient: 61.73 mmHg   Pulmonic Valve   Peak Velocity: 0.97 m/s           Peak Gradient: 3.8 mmHg                                     Estimated PASP: 71.73 mmHg   LVOT   Peak Velocity: 0.78 m/s              Mean Velocity: 0.56 m/s   Peak Gradient: 2.41 mmHg             Mean Gradient: 1.35 mmHg   LVOT Diameter: 1.92 cm               LVOT VTI: 13.34 cm  Structures   Left Atrium   LA Dimension: 4.67 cm                        LA Area: 26.48 cm^2   LA/Aorta: 1.64   LA Volume/Index: 91.47 ml /50 m^2   Left Ventricle   Diastolic Dimension: 1.38 cm          Systolic Dimension: 4.55 cm   Septum Diastolic: 4.35 cm             Septum Systolic: 7.08 cm   PW Diastolic: 8.40 cm                 PW Systolic: 9.57 cm                                         FS: 26.2 %   LV EDV/LV EDV Index: 172.31 ml/94 m^2 LV ESV/LV ESV Index: 85.57 ml/47 m^2   EF Calculated: 50.3 %                 LV Length: 7.86 cm   LVOT Diameter: 1.92 cm   Right Atrium   RA Systolic Pressure: 10 mmHg   Right Ventricle   Diastolic Dimension: 8.25 cm                                     RV Systolic Pressure: 57.97 mmHg  Aorta/ Miscellaneous  Aorta   Aortic Root: 2.84 cm   LVOT Diameter: 1.92 cm         Specimen Collected: 11/28/18 12:29 Last Resulted: 11/28/18 15:31 Order Details View Encounter Lab and Collection Details Routing Result History               Routing History     Priority Sent On From To Message Type    11/28/2018  1:19 PM Tahir, Chpo Incoming Lab Results From Soft Letty Mcclendon MD CC'd Results   PACS Images     Show images for Echocardiogram complete 2D with doppler with color   Signed     Electronically signed by Letty Mcclendon MD on 11/28/18 at 1531 EST   Order Report         Assessment:      Crescendo angina pectoris. Remote coronary artery bypass grafting. Non-ST segment elevation myocardial infarction.

## 2018-11-28 NOTE — PROGRESS NOTES
None  How much help for eating meals?: None  AM-PAC Inpatient Daily Activity Raw Score: 24  AM-PAC Inpatient ADL T-Scale Score : 57.54  ADL Inpatient CMS 0-100% Score: 0    Recommended DME:  [] W/W   [] Cane   [] Rollator   [] W/C   [] Grab Bars  [] Shower Chair   []Dressing AD []  BSC  [] Other:    Plan: ,  N/A    G-Codes:  OT G-codes  Functional Limitation: Self care  Self Care Current Status (): 0 percent impaired, limited or restricted  Self Care Goal Status (): 0 percent impaired, limited or restricted  Self Care Discharge Status (): 0 percent impaired, limited or restricted        Time in:  1025  Time out:  1045  Timed treatment minutes:  20  Total treatment time/minutes:  20    Electronically signed by:    GAVIN Yepez  42/36/0535, 11:03 AM Electronically signed by GAVIN Yepez on 02/24/51 at 11:03 AM

## 2018-11-28 NOTE — PROGRESS NOTES
Independent  Homemaking Assistance: Independent  Homemaking Responsibilities: Yes  Ambulation Assistance: Independent (no Ad)  Transfer Assistance: Independent  Active : No  Additional Comments: Patient does not drive due to macular degen,  drives. OBJECTIVE:   Vision/Hearing:  Vision: Within Functional Limits  Hearing: Within functional limits    Cognition:  Overall Orientation Status: Within Normal Limits  Follows Commands: Within Functional Limits         ROM:  LLE AROM : WFL    Strength:  Strength RLE  Strength RLE: WFL  Strength LLE  Strength LLE: WFL    Neuro:  Balance  Posture: Good  Sitting - Static: Good  Sitting - Dynamic: Good  Standing - Static: Good  Standing - Dynamic: Good             Bed mobility  Supine to Sit: Independent    Transfers  Sit to Stand: Independent  Stand to sit: Independent    Ambulation  Ambulation?: Yes  Ambulation 1  Surface: level tile  Device: No Device  Assistance: Supervision  Quality of Gait: no major deficits noted  Distance: 30 feet     Activity Tolerance  Activity Tolerance: Patient Tolerated treatment well          ASSESSMENT:   Body structures, Functions, Activity limitations: Decreased functional mobility ; Decreased balance  Decision Making: Medium Complexity    Prognosis: Excellent  Patient Education: PT POC, safety in room and use of call light     DISCHARGE RECOMMENDATIONS:  Discharge Recommendations: Continue to assess pending progress, Patient would benefit from continued therapy after discharge    Assessment: Pt demonstrates the above deficits and decline in functional mobility status placing them at increased risk for falls. Pt would benefit from physical therapy to address above deficits and allow for safe return home at highest level of function, decrease risk for falls, and improve QOL.     REQUIRES PT FOLLOW UP: Yes      PLAN OF CARE:  Plan  Times per week: 1-2 follow up visits   Current Treatment Recommendations: Transfer Training, Balance

## 2018-11-28 NOTE — H&P
statin ,nitro SL, lovenox  - TTE  - cardiology consult    Acute CHF  - IV lasix administered in ED  - patient denies dyspnea  - further management per cardiology    Lactic acidosis  - in the setting of NSTEMI  - monitor    DAHLIA vs CKD  - no IVFs due to CHF  - monitor renal function    HTN,NIDDM and hypothyroidism  - continue home meds      Fidelcarlos Nascimento MD  Admitting Hospitalist

## 2018-11-29 PROBLEM — I50.23 ACUTE ON CHRONIC SYSTOLIC CONGESTIVE HEART FAILURE (HCC): Status: ACTIVE | Noted: 2018-01-01

## 2018-11-29 NOTE — PROGRESS NOTES
Physical Therapy Missed Treatment   Facility/Department: [facility] MED SURG X962/J091-60    NAME: Jesse Euceda    : 1940 ([de-identified] y.o.)  MRN: [id number]    Account: [de-identified]  Gender: female            [x] Patient Unavailable: Pt. On strict bedrest s/p cath, there were no orders in the system but per RN Lakia pt. Is  On bedrest for 5 hours. Will attempt PT treatment again at earliest convenience.       Electronically signed by Erick Dent PTA on 18 at 2:52 PM

## 2018-11-29 NOTE — PROGRESS NOTES
11/27/18   1630   PROBNP  14,499      INR: No results for input(s): INR in the last 72 hours. Mg:   Recent Labs      11/29/18   0632   MG  2.3       Cardiac Testing:    ECHO and EKG reviewed  Summary   Image quality is good (8/10).  Left ventricular ejection fraction is visually estimated at 45%.   Normal left ventricular wall thickness.   Left ventricular size is mildly increased .   Severe hypokinesis/akinesis of distal anterior wall,LV apex and apical   inferior wall.   Given regional wall motion abnormality,it is difficult to assess overall   LV systolic function.   Restrictive type of impaired diastolic filling.   Left atrium is dilated and measures 4.5cm.  Right atrium and right ventricle are mildly dilated,RV systolic function   is slightly impaired.   Aortic valve leaflets are mildly thickened.   Tricuspid aortic valve.   No aortic stenosis or regurgitation.   Structurally normal mitral valve.   Moderately severe (3+) mitral regurgitation is present.   MR jet is central.   Effective regurgitatnt orifice area was calculated at 0.3.   Mechanism of Mitral Regurgitation is probably annular dilatation.   There is mild to moderate ( 2 +) tricuspid regurgitation with estimated   RVSP of 65 to 70 mm Hg.   No evidence of any pericardial effusion.   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   No prior echocardiogram report is available for comparison.   Signature   ----------------------------------------------------------------   Electronically signed by Virginia Mistry MD(Interpreting   physician) on 11/28/2018 03:31 PM   ----------------------------------------------------------------   Findings  Left Ventricle  Left ventricular ejection fraction is visually estimated at 45%. Normal left ventricular wall thickness. Left ventricular size is mildly increased . Mitral Valve  Structurally normal mitral valve. Moderately severe (3+) mitral regurgitation is present.   MR jet is central.  Effective

## 2018-11-29 NOTE — CONSULTS
11/29/18  Renal consult dictated     DAHLIA dehydration /hypotension  Plan agree with hydration-limit contrast [facility] serial labs watching Scr  Avoid hypotension thank-you  (no value with HCO3 IV or mucomyst oral)
Toprol,  Synthroid, Lipitor, vitamin complex, omega fatty acids. PHYSICAL EXAMINATION:  VITAL SIGNS:  5 feet 7 inches, 156 pounds, blood pressure presently  120/60. Last evening, her blood pressure had dropped to 99/60. HEENT:  Normocephalic. Pupils react to light. Sclerae are clear. Throat shows no exudates, no dryness of the tongue. NECK:  Supple. No JVD or adenopathy. CHEST:  Chest wall shows healed midline surgical scar. HEART:  Regular with a 1 to 2 over 6 systolic murmur. LUNGS:  Relatively clear. No wheezing, rales, or rhonchi. No accessory  muscle use. ABDOMEN:  Soft. No guarding or rigidity. The patient is overweight. EXTREMITIES:  Show no edema. SKIN:  Warm and dry. NEUROLOGIC:  Moves all limbs appropriately. IMPRESSION:  1. DAHLIA secondary to hypotension from probable dehydration (poor oral  intake over the past few days). 2.  Normal electrolytes. 3.  Metabolic acidosis, mild. 4.  Anemia, etiology uncertain. PLAN:  The patient has been given IV fluids to maintain hydration. No  benefit from bicarbonate or Mucomyst therapy. Limitation of dye trying  to keep contrast less than 40 mL. Repeat BMP in the morning. Hold  Glucophage therapy until renal function known to be stable.         Priscilla Mims DO    D: 11/29/2018 8:55:40       T: 11/29/2018 11:04:05     [email or web address]  Job#: [id number]     Doc#: [id number]    CC:

## 2018-11-29 NOTE — OP NOTE
right coronary artery was performed in multiple orthogonal views. The 5-Maori AR mod  diagnostic catheter was then maneuvered, and 3 saphenous vein grafts was selectively engaged. Selective angiography of the vein graft was performed. The 5 Ardella Cutting AR mod diagnostic catheter and guidewire were removed. and guidewire were removed. A 5 Maori left internal mammary artery diagnostic catheter was advanced over a 0.035 guidewire, and the left internal mammary artery was visualized in a nonselective manner. The catheter was advanced into the left subclavian artery. The left internal mammary artery was very atretic and small, hence not selectively engaged. A 5-Maori angled pigtail catheter was advanced over a 0.035 guidewire across the aortic valve into the left ventricle. Left ventricular end-diastolic pressure was measured. Left ventriculography was performed in about 30° MESSER view. Slow manual pullback was performed across the aortic valve. Summary :      1. Left ventricular end diastolic pressure was 95-67 mmHg. No systolic gradient across the aortic valve. 2. Left ventriculography revealed an EF around 35-40%, with significant wall motion abnormality with akinesis of the distal anterior wall, the entire apex and the apical inferior wall. The basal segments of the anterior wall septum and the inferior wall contract well.  %  3. The left main coronary artery  is 50% smooth distal stenosis best appreciated in the cranial view. .  4. The left anterior descending artery is 100% occluded after moderate sized first diagonal branch. The distal mid anterior descending artery fills by grade 1 collaterals from the right ventricular branch of the right coronary artery. The left anterior descending artery appears to have some competitive flow. 5. The left circumflex is large and nondominant. Proximal vessel less than 20% stenosis.   First obtuse marginal branch measures about 2.25 mm, second obtuse marginal

## 2018-11-29 NOTE — PROGRESS NOTES
Physical Therapy Missed Treatment   Facility/Department: [facility] MED SURG X740/B144-49    NAME: Rossy Sow  Patient Status:   : 1940 ([de-identified] y.o.)  MRN: [id number]  Account: [de-identified]  Gender: female        [] Patient Declines PT Treatment            [x] Patient Unavailable: pt at cardiac cath lab. Will attempt PT Treatment again at earliest convenience.         Electronically signed by Keyla Mills PTA on 18 at 11:17 AM

## 2018-11-30 NOTE — PROGRESS NOTES
[facility] OF THE [facility] Note      Patient: Manfred Thornton  Unit/Bed: G014/I938-65  YOB: 1940  MRN: [id number]  Admit Date:  11/27/2018      Subjective Complaint:   Denies any chest pain with exertion or at rest, palpitations, syncope, or edema. has not ambulated much. Did not want to ablate with physical therapy. Says she has been up and about in the room. Julio White Physical Examination:     BP (!) 99/56   Pulse 64   Temp 98.8 °F (37.1 °C) (Oral)   Resp 22   Ht 5' 7\" (1.702 m)   Wt 156 lb 15.5 oz (71.2 kg)   SpO2 95%   BMI 24.58 kg/m²         Intake/Output Summary (Last 24 hours) at 11/30/18 1752  Last data filed at 11/30/18 0504   Gross per 24 hour   Intake              985 ml   Output              350 ml   Net              635 ml                  Manfred Thornton examined at bedside . in no apparent distress. Was receiving iron infusion, pallor is better. Focused exam reveals:     Cardiac: Heart regular rate and rhythm     Lungs:  clear to auscultation bilaterally- no wheezes, rales or rhonchi, normal air movement, no respiratory distress     Extremities:   No oedema. Telemetry:      normal sinus          LABS:   CBC:   Recent Labs      11/28/18   0341  11/29/18   0632  11/30/18   0858   WBC  5.3  9.2  13.7*   HGB  9.7*  10.1*  9.7*   PLT  216  239  259      BMP:    Recent Labs      11/28/18   0341  11/29/18   0632  11/30/18   0858   NA  139  139  139   K  4.9  4.9  4.6   CL  103  106  106   CO2  14*  16*  12*   BUN  33*  33*  29*   CREATININE  1.12*  1.05*  1.13*   GLUCOSE  130*  98  113*              Troponin: No results for input(s): TROPONINT in the last 72 hours. BNP:   No results for input(s): PROBNP in the last 72 hours. INR: No results for input(s): INR in the last 72 hours. Mg:   Recent Labs      11/30/18   0858   MG  1.9       Cardiac Testing: Cardiac cath 11/29/18:  1. Left ventricular end diastolic pressure was 39-01 mmHg.  No systolic gradient across the aortic

## 2018-11-30 NOTE — PROGRESS NOTES
anterior wall, the entire apex and the apical inferior wall. The basal segments of the anterior wall septum and the inferior wall contract well.  %  3. The left main coronary artery  is 50% smooth distal stenosis best appreciated in the cranial view. .  4. The left anterior descending artery is 100% occluded after moderate sized first diagonal branch. The distal mid anterior descending artery fills by grade 1 collaterals from the right ventricular branch of the right coronary artery. The left anterior descending artery appears to have some competitive flow. 5. The left circumflex is large and nondominant. Proximal vessel less than 20% stenosis. First obtuse marginal branch measures about 2.25 mm, second obtuse marginal branch measures 2.25-2.5 mm, third obtuse marginal branch measures about 2 mm and the fourth obtuse marginal branch measures about 2.5 mm. The distal circumflex artery has less than 20% stenosis  6. The right coronary artery is dominant, totally occluded in the mid segment, large right ventricular branch provides grade 1 collaterals to the mid left anterior descending artery. Distal RCA fills by grade 1 collaterals from the left system. .  7. The origin of 3 saphenous vein grafts was visualized, all these grafts are totally occluded. LIMA is atretic and nonfunctional.      Assessment:     Crescendo angina pectoris. Remote coronary artery bypass grafting. Non-ST segment elevation myocardial infarction. Anemia secondary to iron deficiency. No active bleeding noted. Stool guaiacs pending. Chest x-ray and clinical symptoms do not suggest pneumonia. Abnormal echocardiogram with LV systolic dysfunction, moderate to severe mitral regurgitation, severe pulmonary hypertension and no pericardial effusion. Chronic kidney disease stage III.        Situational stress- has dementia, and needs help getting around, and managing by himself.     Cardiac cath findings are as

## 2018-11-30 NOTE — PROGRESS NOTES
CALCIUM  8.5*  8.7  8.3*   PHOS  4.0  2.8  1.6*     Recent Labs      11/27/18   1630   AST  13   ALT  11   BILITOT  0.9   ALKPHOS  60     No results for input(s): INR in the last 72 hours. Recent Labs      11/27/18   1630   11/28/18   0341  11/28/18   1238  11/28/18   1823   CKTOTAL  49   --    --    --    --    TROPONINI  0.499*   < >  0.290*  0.283*  0.365*    < > = values in this interval not displayed.        Urinalysis:      Lab Results   Component Value Date    NITRU Negative 11/27/2018    WBCUA 20-50 05/14/2016    BACTERIA Moderate 05/14/2016    RBCUA 0-2 05/14/2016    BLOODU Negative 11/27/2018    SPECGRAV 1.013 11/27/2018    GLUCOSEU >=1000 11/27/2018    GLUCOSEU NEG 01/10/2012       Radiology:  XR CHEST PORTABLE   Final Result   BIBASILAR AREAS OF INFILTRATES CONSOLIDATION BOTH BASES WITH SMALL BILATERAL PLEURAL EFFUSIONS              Assessment/Plan:    [de-identified] y.o. female with PMH of  CAD s/p CABG,HTN,NIDDM and hypothyroidism who presented with chest pain.     NSTEMI  - ECG showed an old LBBB, no acute ischemic changes,   - serial troponins trending down  - continue ASA, statin ,nitro SL, lovenox  - plan for cardiac cath today per cardiology      Acute systolic HF  - IV lasix administered in ED  - TTE showed LVEF of 45% and severe MR  - started on Entresto and toprol by cardiology    NSVT  - monitor on telemetry  - continue toprol  - IV lopressor PRN     Starvation ketoacidosis  - worsening,   - patient advised to increase oral intake  - nutrition consult  - monitor    DAHLIA vs CKD3  - improving   - nephrology following  - monitor renal function    Iron deficiency anemia  - started on IV iron  - monitor H/H    Hypomagnesemia   - replaced     HTN,NIDDM and hypothyroidism  - continue home meds               Electronically signed by Chelsy Pierce MD on 11/30/2018 at 10:52 AM

## 2018-12-01 NOTE — PROGRESS NOTES
Glucose    Collection Time: 11/30/18 11:21 AM   Result Value Ref Range    POC Glucose 156 (H) 60 - 115 mg/dl    Performed on ACCU-CHEK    Lactic Acid, Plasma    Collection Time: 11/30/18 12:17 PM   Result Value Ref Range    Lactic Acid 2.1 0.5 - 2.2 mmol/L   BETA HYDROXYBUTYRATE    Collection Time: 11/30/18 12:17 PM   Result Value Ref Range    Beta-Hydroxybutyrate 29.8 (H) 0.2 - 2.8 mg/dL   POCT Glucose    Collection Time: 11/30/18  4:15 PM   Result Value Ref Range    POC Glucose 119 (H) 60 - 115 mg/dl    Performed on ACCU-CHEK    POCT Glucose    Collection Time: 11/30/18  9:02 PM   Result Value Ref Range    POC Glucose 113 60 - 115 mg/dl    Performed on ACCU-CHEK    POCT Glucose    Collection Time: 12/01/18  6:14 AM   Result Value Ref Range    POC Glucose 107 60 - 115 mg/dl    Performed on ACCU-CHEK    CBC    Collection Time: 12/01/18  6:15 AM   Result Value Ref Range    WBC 10.3 4.8 - 10.8 K/uL    RBC 3.70 (L) 4.20 - 5.40 M/uL    Hemoglobin 9.6 (L) 12.0 - 16.0 g/dL    Hematocrit 30.2 (L) 37.0 - 47.0 %    MCV 81.6 (L) 82.0 - 100.0 fL    MCH 26.0 (L) 27.0 - 31.3 pg    MCHC 31.8 (L) 33.0 - 37.0 %    RDW 17.4 (H) 11.5 - 14.5 %    Platelets 598 195 - 816 K/uL   RENAL FUNCTION PANEL    Collection Time: 12/01/18  6:15 AM   Result Value Ref Range    Sodium 142 132 - 144 mEq/L    Potassium 3.9 3.5 - 5.1 mEq/L    Chloride 109 (H) 98 - 107 mEq/L    CO2 16 (L) 22 - 29 mEq/L    Anion Gap 17 (H) 7 - 13 mEq/L    Glucose 105 74 - 109 mg/dL    BUN 21 8 - 23 mg/dL    CREATININE 0.84 0.50 - 0.90 mg/dL    GFR Non-African American >60.0 >60    GFR  >60.0 >60    Calcium 8.5 (L) 8.6 - 10.2 mg/dL    Phosphorus 1.4 (L) 2.5 - 4.5 mg/dL    Alb 3.1 (L) 3.9 - 4.9 g/dL   Magnesium    Collection Time: 12/01/18  6:15 AM   Result Value Ref Range    Magnesium 1.8 1.7 - 2.3 mg/dL         EKG: SR    Echo:   Left ventricular ejection fraction is visually estimated at 45%.   Normal left ventricular wall thickness.   Left ventricular size is mildly increased .   Severe hypokinesis/akinesis of distal anterior wall,LV apex and apical   inferior wall.   Given regional wall motion abnormality,it is difficult to assess overall   LV systolic function.   Restrictive type of impaired diastolic filling.   Left atrium is dilated and measures 4.5cm.    Right atrium and right ventricle are mildly dilated,RV systolic function   is slightly impaired.   Aortic valve leaflets are mildly thickened.   Tricuspid aortic valve.   No aortic stenosis or regurgitation.   Structurally normal mitral valve.   Moderately severe (3+) mitral regurgitation is present.   MR jet is central.   Effective regurgitatnt orifice area was calculated at 0.3.   Mechanism of Mitral Regurgitation is probably annular dilatation.   There is mild to moderate ( 2 +) tricuspid regurgitation with estimated   RVSP of 65 to 70 mm Hg.   No evidence of any pericardial effusion.   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   No prior echocardiogram report is available for Ciaran Bardales MD ,[facility] Card[facility]

## 2018-12-01 NOTE — DISCHARGE SUMMARY
Hospital Medicine Discharge Summary    Valerie Puga  :  1940  MRN:  [id number]    Admit date:  2018  Discharge date:  2018    Admitting Physician:  Keerthi Rosado MD  Primary Care Physician:  Braden Pierce MD      Discharge Diagnoses: Active Problems:    NSTEMI (non-ST elevated myocardial infarction) ([address]    Acute on chronic systolic congestive heart failure (HCC)  Resolved Problems:    * No resolved hospital problems. *      Hospital Course:   Valerie Puga is a [de-identified] y.o. female that was admitted and treated at [facility] for the following medical issues:     NSTEMI  - ECG showed an old LBBB, no acute ischemic changes,   - s/p cardiac cath   - management per cardiology      Acute systolic HF  - IV lasix administered in ED  - TTE showed LVEF of 45% and severe MR  - started on Entresto, toprol decreased, OK for d/c per cardiology    Ketoacidosis  - no DKA, likely related to poor oral intake  - improved   - patient advised to increase oral intake    DAHLIA    - resolved   - nephrology following  - monitor renal function    Iron deficiency anemia  - treated with IV iron  - discharged on oral supplement      Patient was seen by the following consultants while admitted to [facility]:   Consults:  Pippa Richards HOSPITALIST  IP CONSULT TO CARDIOLOGY  IP CONSULT TO CARDIOLOGY  IP CONSULT TO NEPHROLOGY  IP CONSULT TO DIETITIAN    Significant Diagnostic Studies:    Xr Chest Portable    Result Date: 2018  EXAMINATION: CHEST PORTABLE VIEW  CLINICAL HISTORY: pale appearence COMPARISONS: 2017  FINDINGS: Single  views of the chest is submitted. There are multiple median sternotomy wires. The cardiac silhouette is enlarged. .  The mediastinum is unremarkable. Pulmonary vascular unremarkable. Right sided trachea. There are bibasilar areas of infiltrates consolidation both bases with small bilateral pleural effusions. .  No Pneumothoraces.  There is

## 2018-12-03 NOTE — TELEPHONE ENCOUNTER
CLINICAL PHARMACY NOTE  Post-Discharge Transitions of Care ([facility]IK)    Subjective/Objective:  Renetta Hopkins is a [de-identified] y.o. female. Patient was discharged from [facility] on 12/1/18 with a diagnosis of NSTEMI, HF. Patient outreach to review discharge medications and provide medication review and management. Spoke with patient    Allergies   Allergen Reactions    Carlos-1  [Lidocaine] Other (See Comments)     Made Eyes burn Badly, and couldn't see.  Procaine Other (See Comments)     Pass out, Crash cart      Discharge Medications (as per current medication list/AVS):  There are NEW medications for you.  START taking them after you leave the hospital:  Current Outpatient Prescriptions   Medication Sig Dispense Refill    aspirin 81 MG EC tablet    *confirms decr from 325mg Take 1 tablet by mouth daily 30 tablet 3    sacubitril-valsartan (ENTRESTO) 24-26 MG per tablet    *sts does NOT have - is $125/month, which she will not afford/pay Take 1 tablet by mouth 2 times daily 60 tablet 3    ferrous sulfate 325 (65 Fe) MG EC tablet Take 1 tablet by mouth daily (with breakfast) 90 tablet 1     You told us you were taking these medications at home, but the amount or how often you take this medication has CHANGED:  Current Outpatient Prescriptions   Medication Sig Dispense Refill    metoprolol succinate (TOPROL XL) 50 MG extended release tablet    *confirms dose decrease Take 1 tablet by mouth daily 30 tablet 3    metFORMIN (GLUCOPHAGE) 850 MG tablet      *not a change; sts has been taking one daily for \"a long time\" and appears A1c has been @6.2 - updated TAKE ONE TABLET BY MOUTH TWICE DAILY WITH MEALS (Patient taking differently: Take 850 mg by mouth daily (with breakfast) TAKE ONE TABLET BY MOUTH TWICE DAILY WITH MEALS) 180 tablet 1     These are medications you told us you were taking at home, CONTINUE taking them after you leave the hospital:  Current Outpatient Prescriptions   Medication Sig Dispense Refill    diazepam (VALIUM) 5 MG tablet Take 5 mg by mouth nightly as needed. Mauro Milner empagliflozin (JARDIANCE) 25 MG tablet TAKE ONE TABLET BY MOUTH ONCE DAILY 90 tablet 3    levothyroxine (SYNTHROID) 125 MCG tablet TAKE ONE TABLET BY MOUTH EVERY DAY 90 tablet 3    atorvastatin (LIPITOR) 80 MG tablet TAKE ONE TABLET BY MOUTH EVERY DAY 90 tablet 3    nitroGLYCERIN (NITROSTAT) 0.4 MG SL tablet Place 1 tablet under the tongue every 5 minutes as needed for Chest pain 25 tablet 1    beta carotene 15 MG capsule Take 1 capsule by mouth daily 30 capsule 11    Multiple Vitamins-Minerals (PRESERVISION AREDS PO) Take  by mouth daily.  fish oil-omega-3 fatty acids 1000 MG capsule Take 2 g by mouth daily.  B Complex Vitamins (VITAMIN B COMPLEX PO) Take  by mouth daily. These are the medications you have told us you were taking at home, STOP taking them after you leave the hospital:  N/A    Additional Medications:  Vitamin D (sts she's unsure of strength)    Estimated Creatinine Clearance: 52 mL/min (based on SCr of 0.84 mg/dL). Assessment/Plan:  - Medication reconciliation completed. - Pt is not taking medications as directed by discharging physician. Number of discrepancies: 2. Instructions per discharge list provided except per below documentation. Identified medication discrepancies/issues:   · Category 3 (1):  1. Does NOT have Entresto, d/t cost  · Spoke to Ning Vasquez at [facility]; confirms appt 12/13  · [address] re above, patient not taking [facility] she will review with Dr Sammie Jackson and they will f/u with patient  · Category 4 (1):  1.  Medication list updated as noted above    - Identified Potential Medication Interactions: No clinically significant interactions identified via [email or web address] Interaction Analysis as category D or higher. (separate levothyroxine/ferrous sulfate/vitamin)    - Renal Dosing: No renal adjustments necessary.    - Follow up appointment date (7 days for more severe

## 2018-12-07 NOTE — ED NOTES
Discharge instructions provided, educated patient on dx, scripts and the need to follow up with PMD.  Heplock removed tip fully intact, assisted patient with getting dressed.   Patient stated \"my daughter in law in out in the lobby to take me home\"  Assisted patient out to the lobby     Stacy Quintero RN  12/06/18 2021

## 2018-12-16 NOTE — ED PROVIDER NOTES
for headaches. Psychiatric/Behavioral: Negative for confusion and sleep disturbance. Except as noted above the remainder of the review of systems was reviewed and negative. PAST MEDICAL HISTORY     Past Medical History:   Diagnosis Date    Anxiety     CAD (coronary artery disease)     Diabetes mellitus ([address]     Hyperlipidemia     Hypertension     Hypothyroidism     Upper GI bleed          SURGICALHISTORY       Past Surgical History:   Procedure Laterality Date    CHOLECYSTECTOMY  1993    CORONARY ARTERY BYPASS GRAFT      X2         CURRENT MEDICATIONS       Previous Medications    ASPIRIN 81 MG EC TABLET    Take 1 tablet by mouth daily    ATORVASTATIN (LIPITOR) 80 MG TABLET    TAKE ONE TABLET BY MOUTH EVERY DAY    B COMPLEX VITAMINS (VITAMIN B COMPLEX PO)    Take  by mouth daily. BETA CAROTENE 15 MG CAPSULE    Take 1 capsule by mouth daily    CHOLECALCIFEROL (VITAMIN D PO)    Take by mouth daily    DIAZEPAM (VALIUM) 5 MG TABLET    Take 5 mg by mouth nightly as needed. Mark Ji EMPAGLIFLOZIN (JARDIANCE) 25 MG TABLET    TAKE ONE TABLET BY MOUTH ONCE DAILY    FERROUS SULFATE 325 (65 FE) MG EC TABLET    Take 1 tablet by mouth daily (with breakfast)    FISH OIL-OMEGA-3 FATTY ACIDS 1000 MG CAPSULE    Take 2 g by mouth daily. LEVOTHYROXINE (SYNTHROID) 125 MCG TABLET    TAKE ONE TABLET BY MOUTH EVERY DAY    METFORMIN (GLUCOPHAGE) 850 MG TABLET    Take 850 mg by mouth daily (with breakfast)    METOPROLOL SUCCINATE (TOPROL XL) 50 MG EXTENDED RELEASE TABLET    Take 1 tablet by mouth daily    MULTIPLE VITAMINS-MINERALS (PRESERVISION AREDS PO)    Take  by mouth daily.     NITROGLYCERIN (NITROSTAT) 0.4 MG SL TABLET    Place 1 tablet under the tongue every 5 minutes as needed for Chest pain    PREDNISONE (DELTASONE) 20 MG TABLET    Take 1 tablet by mouth daily for 14 days       ALLERGIES     Carlos-1  [lidocaine] and Procaine    FAMILY HISTORY       Family History   Problem Relation Age of Onset   

## 2018-12-17 PROBLEM — I50.9 HEART FAILURE (HCC): Status: ACTIVE | Noted: 2018-01-01

## 2018-12-17 NOTE — PROGRESS NOTES
Laura Wells is a [de-identified] y.o. female patient. Current Facility-Administered Medications   Medication Dose Route Frequency Provider Last Rate Last Dose    insulin lispro (HUMALOG) injection vial 0-12 Units  0-12 Units Subcutaneous TID WC Joseph Stevenson MD   2 Units at 12/17/18 0932    insulin lispro (HUMALOG) injection vial 0-6 Units  0-6 Units Subcutaneous Nightly Joseph Stevenson MD        glucose (GLUTOSE) 40 % oral gel 15 g  15 g Oral PRN Joseph Stevenson MD        dextrose 50 % solution 12.5 g  12.5 g Intravenous PRN Joseph Stevenson MD        glucagon (rDNA) injection 1 mg  1 mg Intramuscular PRN Joseph Stevenson MD        dextrose 5 % solution  100 mL/hr Intravenous PRN Joseph Stevenson MD        aspirin EC tablet 81 mg  81 mg Oral Daily Joseph Stevenson MD   81 mg at 12/17/18 0931    levothyroxine (SYNTHROID) tablet 125 mcg  125 mcg Oral Daily Joseph Stevenson MD   125 mcg at 12/17/18 0545    metoprolol succinate (TOPROL XL) extended release tablet 50 mg  50 mg Oral Daily Joseph Stevenson MD   Stopped at 12/17/18 5534    sodium chloride flush 0.9 % injection 10 mL  10 mL Intravenous 2 times per day Joseph Stevenson MD   10 mL at 12/17/18 0931    sodium chloride flush 0.9 % injection 10 mL  10 mL Intravenous PRN Joseph Stevenson MD        magnesium hydroxide (MILK OF MAGNESIA) 400 MG/5ML suspension 30 mL  30 mL Oral Daily PRN Joseph Stevenson MD        ondansetron [facility]) injection 4 mg  4 mg Intravenous Q6H PRN Joseph Stevenson MD        enoxaparin (LOVENOX) injection 40 mg  40 mg Subcutaneous Daily Joseph Stevenson MD   40 mg at 12/17/18 0931    famotidine (PEPCID) injection 20 mg  20 mg Intravenous Daily Joseph Stevenson MD   20 mg at 12/17/18 0931    lisinopril (PRINIVIL;ZESTRIL) tablet 5 mg  5 mg Oral Daily Joseph Stevenson MD   5 mg at 12/17/18 0667     Allergies   Allergen Reactions    Carlos-1  [Lidocaine] Other (See Comments)     Made Eyes burn Badly, and couldn't see.      Kt Other (See Comments) Pass out, Crash cart      Active Problems:    Heart failure ([address]  Resolved Problems:    * No resolved hospital problems. *    Blood pressure 132/67, pulse 57, temperature 97.2 °F (36.2 °C), temperature source Oral, resp. rate 18, height 5' 7\" (1.702 m), weight 155 lb 11.2 oz (70.6 kg), SpO2 99 %, not currently breastfeeding. Subjective:  Symptoms:  She reports malaise and weakness. No shortness of breath, cough, chest pain, headache, chest pressure, anorexia, diarrhea or anxiety. Diet:  No nausea or vomiting. Objective:  General Appearance:  Comfortable, well-appearing and in no acute distress. Vital signs: (most recent): Blood pressure 132/67, pulse 57, temperature 97.2 °F (36.2 °C), temperature source Oral, resp. rate 18, height 5' 7\" (1.702 m), weight 155 lb 11.2 oz (70.6 kg), SpO2 99 %, not currently breastfeeding. HEENT: Normal HEENT exam.    Lungs:  Normal effort and normal respiratory rate. Breath sounds clear to auscultation. Heart: Normal rate. Regular rhythm. S1 normal and S2 normal.    Abdomen: Abdomen is soft. Bowel sounds are normal.   There is no abdominal tenderness. Pulses: Distal pulses are intact. Neurological: Patient is alert and oriented to person, place and time. Pupils:  Pupils are equal, round, and reactive to light. Skin:  Warm and dry.       Assessment & Plan  1) severe Mitral regurg with EF of 45 %  Transfer to F once there is a bed available  2) lactic acidosis  improving  3) GI bleed resolved hgb is stable    4) elevated trop 2 to DAHLIA  Renal eval  Kamini Glover MD  12/17/2018

## 2018-12-17 NOTE — ED NOTES
Patient report called to 1W at this time. No distress noted. Patient placed on tele at this time.       Nicolle Echavarria RN  12/17/18 7486

## 2018-12-17 NOTE — FLOWSHEET NOTE
Received from ER alert and oriented x 3, on RA no distress noted. Denies CP. Pt is weak, able to ambulate from cart to bed with assistance. Pt is for transfer to [facility], no bed available.  To ff-up transfer in am.

## 2018-12-17 NOTE — ED NOTES
Patient asleep in bed, easily arousable. No distress noted. Patient has no complaints at this time. Patient remains on cardiac monitor.       Rosalinda Thakur RN  12/16/18 5526

## 2018-12-17 NOTE — H&P
obvious abnormality   NECK:  Supple, symmetrical, trachea midline, no adenopathy, thyroid symmetric, not enlarged and no tenderness, skin normal  HEMATOLOGIC/LYMPHATICS:  no cervical lymphadenopathy and no supraclavicular lymphadenopathy  BACK:  Symmetric, no curvature   LUNGS:  No increased work of breathing, good air exchange, clear to auscultation bilaterally, no crackles or wheezing  CARDIOVASCULAR:  Normal apical impulse, regular rate and rhythm, normal S1 and S2, no S3 or S4, and no murmur was appreciated, +1 pitting edema  ABDOMEN:  No scars, normal bowel sounds, soft, non-distended, non-tender, no masses palpated, no hepatosplenomegally  CHEST: no masses palpated, no axillary or supraclavicular adenopathy  MUSCULOSKELETAL:  There is no redness, warmth, or swelling of the joints. Full range of motion noted. Motor strength is 5 out of 5 all extremities bilaterally. Tone is normal.  NEUROLOGIC:  Awake, alert, oriented to name, place and time. Cranial nerves II-XII are grossly intact.     SKIN:  no bruising or bleeding, normal skin color, texture, turgor, no redness, warmth, or swelling, no rashes, no lesions, no abnormal moles, nails normal without discoloration or clubbing and no jaundice    Labs:  Recent Results (from the past 24 hour(s))   EKG 12 Lead - Chest Pain    Collection Time: 12/16/18  3:41 PM   Result Value Ref Range    Ventricular Rate 48 BPM    Atrial Rate 48 BPM    P-R Interval 154 ms    QRS Duration 138 ms    Q-T Interval 534 ms    QTc Calculation (Bazett) 477 ms    P Axis 74 degrees    R Axis -24 degrees    T Axis 134 degrees   Troponin    Collection Time: 12/16/18  3:45 PM   Result Value Ref Range    Troponin 0.164 (HH) 0.000 - 0.010 ng/mL   CBC Auto Differential    Collection Time: 12/16/18  3:45 PM   Result Value Ref Range    WBC 10.8 4.8 - 10.8 K/uL    RBC 4.06 (L) 4.20 - 5.40 M/uL    Hemoglobin 11.3 (L) 12.0 - 16.0 g/dL    Hematocrit 35.8 (L) 37.0 - 47.0 %    MCV 88.0 82.0 - 100.0 fL

## 2018-12-17 NOTE — ED NOTES
Called [facility] spoke with Clinton Cagle to check the status of a bed placement. Was informed that there is no bed available.      Ya JENSEN Page  12/16/18 2007

## 2018-12-18 NOTE — PROGRESS NOTES
Pharmacy Dose Adjustment Per Protocol    Flores Tomas is a [de-identified] y.o. female. Recent Labs      12/17/18   0408  12/18/18   0544   BUN  39*  34*       Recent Labs      12/17/18   0408  12/18/18   0544   CREATININE  1.50*  1.60*       Estimated Creatinine Clearance: 27 mL/min (A) (based on SCr of 1.6 mg/dL (H)). Height:   Ht Readings from Last 1 Encounters:   12/17/18 5' 7\" (1.702 m)     Weight:  Wt Readings from Last 1 Encounters:   12/17/18 155 lb 11.2 oz (70.6 kg)         Drug Ordered Therapeutic Interchange (CrCL ? 30 mL/min)   X Enoxaparin 40 mg subq daily Enoxaparin 30 mg subq daily   ? Enoxaparin 1 mg/kg subq BID Enoxaparin ________ (1 mg/kg) subq daily    ?  Enoxaparin 30 subq BID Enoxaparin 30 mg subq daily

## 2018-12-18 NOTE — DISCHARGE SUMMARY
Marilyn Marks MD        glucagon (rDNA) injection 1 mg  1 mg Intramuscular PRN Joseph Stevenson MD        dextrose 5 % solution  100 mL/hr Intravenous PRN Joseph Stevenson MD        aspirin EC tablet 81 mg  81 mg Oral Daily Joseph Stevenson MD   81 mg at 12/18/18 7188    levothyroxine (SYNTHROID) tablet 125 mcg  125 mcg Oral Daily Joseph Stevenson MD   125 mcg at 12/18/18 0825    metoprolol succinate (TOPROL XL) extended release tablet 50 mg  50 mg Oral Daily Joseph Stevenson MD   50 mg at 12/18/18 8339    sodium chloride flush 0.9 % injection 10 mL  10 mL Intravenous 2 times per day Joseph Stevenson MD   10 mL at 12/18/18 7986    sodium chloride flush 0.9 % injection 10 mL  10 mL Intravenous PRN Joseph Stevenson MD        magnesium hydroxide (MILK OF MAGNESIA) 400 MG/5ML suspension 30 mL  30 mL Oral Daily PRN Joseph Stevenson MD        ondansetron [facility]) injection 4 mg  4 mg Intravenous Q6H PRN Joseph Stevenson MD             Discharge Exam:  HEENT: AT/NC, PERRLA, JVD  HEART:+ murmur  LUNG: clear  ABD: soft, NT  EXT: no edema  SKin : no rash  Neuro:no focal deficits      Disposition: [facility], Dr. Yanely Ayala  Patient Instructions:   [unfilled]  Activity: as tolerated  Diet: cardiac diet/diabetic diet    Follow-up with PCP in 1 week  Overtime on transferring the pt and discharging was 40 min  Signed:  Beck Cross  12/18/2018  1:02 PM

## 2018-12-18 NOTE — PROGRESS NOTES
The patient currently meets [address] [place] [address] guidelines for IV to PO conversion.  Unless otherwise ordered, the patient will be changed from Pepcid 20 mg IV BID to Pepcid 20 mg PO BID

## 2018-12-18 NOTE — CARE COORDINATION
Care Transition Nurse (CTN) provided CHF booklet and zone sheet and reviewed at length. Stressed importance of phoning physician promptly for symptoms in the yellow zones and ems for symptoms in the red zones. Discussed importance of weighing self daily and reporting weight gain of 3 or more pounds in 1-7 days, edema, sob, fatigue. Pt states she has a scale at home, but usually only weighs about every 3 to 4 days. Reinfored the need to weigh daily as that is one of the earliest indicators of early CHF. Pt agreeable, but in conversing with pt, she seems to have periods of confusion and talked about things not in context with current conversation. Discussed importance of following a low sodium diet and to keep daily sodium intake to no more than 2000 mg per day. Explained that processed food and restaurant foods are typically high in sodium and should be avoided. 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 Reviewed vaccines, good nutrition, drinking plenty of fluids unless restricted. Advised to use log in CHF booklet and log weight, blood pressure, pulse, and which zone he is in. Planned transfer to [facility] today when bed is available. Pt is a Population Health Patient and they will follow after discharge for high risk needs.

## 2018-12-19 NOTE — PROGRESS NOTES
[facility] Therapy Evaluation   Current Order:  PRN duoneb     Home Regimen: no     Ordering Physician: Marciana Mcburney  Re-evaluation Date:  na    Diagnosis: heart failure  Patient Status: Stable / Unstable + Physician notified    The following MDI Criteria must be met in order to convert aerosol to MDI with spacer. If unable to meet, MDI will be converted to aerosol:  []  Patient able to demonstrate the ability to use MDI effectively  []  Patient alert and cooperative  []  Patient able to take deep breath with 5-10 second hold  []  Medication(s) available in this delivery method   []  Peak flow greater than or equal to 200 ml/min            Current Order Substituted To  (same drug, same frequency)   Aerosol to MDI [] Albuterol Sulfate 0.083% unit dose by aerosol Albuterol Sulfate MDI 2 puffs by inhalation with spacer    [] Levalbuterol 1.25 mg unit dose by aerosol Levalbuterol MDI 2 puffs by inhalation with spacer    [] Levalbuterol 0.63 mg unit dose by aerosol Levalbuterol MDI 2 puffs by inhalation with spacer    [] Ipratropium Bromide 0.02% unit dose by aerosol Ipratropium Bromide MDI 2 puffs by inhalation with spacer    [] Duoneb (Ipratropium + Albuterol) unit dose by aerosol Ipratropium MDI + Albuterol MDI 2 puffs by inhalation w/spacer   MDI to Aerosol [] Albuterol Sulfate MDI Albuterol Sulfate 0.083% unit dose by aerosol    [] Levalbuterol MDI 2 puffs by inhalation Levalbuterol 1.25 mg unit dose by aerosol    [] Ipratropium Bromide MDI by inhalation Ipratropium Bromide 0.02% unit dose by aerosol    [] Combivent (Ipratropium + Albuterol) MDI by inhalation Duoneb (Ipratropium + Albuterol) unit dose by aerosol   Treatment Assessment [Frequency/Schedule]:  Change frequency to: _______no change___________________________________________per Protocol, P&T, MEC      Points 0 1 2 3 4   Pulmonary Status  Non-Smoker  [x]   Smoking history   < 20 pack years  []   Smoking history  ?  20 pack years  []   Pulmonary

## 2018-12-19 NOTE — PROGRESS NOTES
cardiomyopathy LV EF of about 20% and 3+ mitral regurgitation. 2. Moderate to severe pulmonary hypertension probably related to left heart failure and mitral regurgitation. 3.    Chronic kidney disease with acute decompensation. 4.    New anterolateral ST-T abnormality, significance of which is unclear. 5.    All coronary bypass grafts are occluded, patient with 50% left main stenosis, needs further evaluation. 6.    Altered mental status  7. Peripheral vascular disease     Plan:  1. Will try low-dose milrinone infusion. 2. She needs transfer to tertiary care center, and I have been working in conjunction with hospitalist service. Waiting bed at [facility] vascular Center. 3. Hold beta blockers given bradycardia. 4. No Ace or arm because of acute kidney injury, most likely prerenal.  She does not clinically appear to be volume overloaded. Prognosis is poor. She needs oxygen, preload and afterload reduction, evaluation for high risk revascularization mitral clip, and possible biventricular ICD.    I===================================================    Electronically signed by Jeni Crawford MD on 12/18/2018 at 9:02 PM

## 2018-12-19 NOTE — CONSULTS
0408  12/18/18   0544  12/18/18   1238   NA  134  136  141   --    K  5.3*  4.7  4.8   --    CL  98  103  104   --    CO2  15*  16*  15*   --    BUN  41*  39*  34*   --    CREATININE  1.57*  1.50*  1.60*  1.2   LABGLOM  31.7*  33.4*  31.0*  43*     ABGs: No results found for: PH, PO2, PCO2  INR:   Recent Labs      12/16/18   1616   INR  1.6     PRO-BNP:   Lab Results   Component Value Date    PROBNP 29,774 12/16/2018    PROBNP 7,943 12/06/2018      TSH:   Lab Results   Component Value Date    TSH 0.201 (L) 12/17/2018      Cardiac Injury Profile: Recent Labs      12/16/18   1545  12/17/18   0408  12/17/18   0925   TROPONINI  0.164*  0.140*  0.125*      Lipid Profile: Lab Results   Component Value Date    TRIG 103 12/18/2018    HDL 30 12/18/2018    LDLCALC 97 12/18/2018    CHOL 148 12/18/2018      Hemoglobin A1C: No components found for: HGBA1C       Intake/Output Summary (Last 24 hours) at 12/18/18 2023  Last data filed at 12/18/18 0829   Gross per 24 hour   Intake              100 ml   Output              350 ml   Net             -250 ml        IMPRESSIONS:  1. Acute on chronic class IV congestive left heart failure due to a combination of ischemic cardiomyopathy LV EF of about 20% and 3+ mitral regurgitation. 2. Moderate to severe pulmonary hypertension probably related to left heart failure and mitral regurgitation. 3.    Chronic kidney disease with acute decompensation. 4.    New anterolateral ST-T abnormality, significance of which is unclear. 5.    All coronary bypass grafts are occluded, patient with 50% left main stenosis, needs further evaluation. 6.    Altered mental status  7. Peripheral vascular disease          RECOMMENDATIONS:    1. Will try low-dose milrinone infusion. 2. She needs transfer to tertiary care center, and I have been working in conjunction with hospitalist service. Waiting bed at [facility] vascular Center. 3. Hold beta blockers given bradycardia.   4. No

## 2018-12-20 NOTE — FLOWSHEET NOTE
RN received bed assignment at [facility]. [facility] [place] [facility], room [id number]. RN updated pt. Pt's daughter insists that the hospitalist comes to speak with them. She reports that she is a nurse at [facility] and she \"knows that there are bed open in CVICU. \" She reports that she and the [facility] doctors \"want her mother to go there. \" RN sent perfectserve to Dr. Alcides Bragg. Spoke with him to update. He reports he will pass this information on to Dr. Paul Lipscomb and have him call RN.  Electronically signed by Brisa Anna RN on 12/19/2018 at 8:02 PM

## 2018-12-20 NOTE — PROGRESS NOTES
daughter and she agreed to disclose information. I have also been in touch with [facility] vascular Center regarding transfer and bed availability. Personally spoke to Dr. Kerry Garg. Also received a call from Dr. Maurisio Ewing asking if patient could be transferred to [address] [place] [address].  I would do that if daughter wanted her transferred to [address] [place] [address].  At this point patient needs a tertiary care center for her needs and the team approach to deciding what the best course of action is, the team should include interventionalist, heart failure service, and structural heart disease team.        Assessment:  1. Acute on chronic class IV congestive left heart failure due to a combination of ischemic cardiomyopathy LV EF of about 20% and 3+ mitral regurgitation. 2. Moderate to severe pulmonary hypertension probably related to left heart failure and mitral regurgitation. 3.    Chronic kidney disease with acute decompensation. Her renal function is slightly improved. 4.    New anterolateral ST-T abnormality, significance of which is unclear. 5.    All coronary bypass grafts are occluded, patient with 50% left main stenosis, needs further evaluation. 6.    Altered mental status  7. Peripheral vascular disease     Plan:  1. She is tolerating low-dose milrinone infusion. 2. She needs transfer to tertiary care center, and I have been working in conjunction with hospitalist service. Waiting bed at [facility] vascular Center. 3. Hold beta blockers given bradycardia. 4. No ACEI  or ARB because of acute kidney injury, most likely prerenal.  She does not clinically appear to be volume overloaded. Prognosis is poor. She needs oxygen, preload and afterload reduction, evaluation for high risk revascularization mitral clip, and possible biventricular ICD vs Palliative care.   Daughter understands the complexity of the situation and the confounding variables in her prognosis.   Daughter also knows that patient's health has been declining over the past year.    I===================================================    Electronically signed by Rinku Branham MD on 12/19/2018 at 9:37 PM

## 2019-01-08 ENCOUNTER — TELEPHONE (OUTPATIENT)
Dept: INTERNAL MEDICINE CLINIC | Age: 79
End: 2019-01-08